# Patient Record
Sex: FEMALE | Race: WHITE | NOT HISPANIC OR LATINO
[De-identification: names, ages, dates, MRNs, and addresses within clinical notes are randomized per-mention and may not be internally consistent; named-entity substitution may affect disease eponyms.]

---

## 2018-10-02 ENCOUNTER — APPOINTMENT (OUTPATIENT)
Dept: ENDOVASCULAR SURGERY | Facility: CLINIC | Age: 58
End: 2018-10-02

## 2019-11-23 ENCOUNTER — INPATIENT (INPATIENT)
Facility: HOSPITAL | Age: 59
LOS: 3 days | Discharge: NURSING, REHAB OR LONG-TERM CARE | DRG: 871 | End: 2019-11-27
Attending: HOSPITALIST | Admitting: HOSPITALIST
Payer: MEDICARE

## 2019-11-23 VITALS
DIASTOLIC BLOOD PRESSURE: 67 MMHG | SYSTOLIC BLOOD PRESSURE: 97 MMHG | HEART RATE: 104 BPM | RESPIRATION RATE: 24 BRPM | OXYGEN SATURATION: 92 %

## 2019-11-23 LAB
ALBUMIN SERPL ELPH-MCNC: 3.7 G/DL — SIGNIFICANT CHANGE UP (ref 3.3–5)
ALP SERPL-CCNC: 161 U/L — HIGH (ref 40–120)
ALT FLD-CCNC: 8 U/L — LOW (ref 10–45)
AMMONIA BLD-MCNC: 39 UMOL/L — SIGNIFICANT CHANGE UP (ref 11–55)
ANION GAP SERPL CALC-SCNC: 15 MMOL/L — SIGNIFICANT CHANGE UP (ref 5–17)
ANISOCYTOSIS BLD QL: SIGNIFICANT CHANGE UP
APTT BLD: 44.7 SEC — HIGH (ref 27.5–36.3)
AST SERPL-CCNC: 27 U/L — SIGNIFICANT CHANGE UP (ref 10–40)
BASE EXCESS BLDV CALC-SCNC: 5.3 MMOL/L — HIGH (ref -2–2)
BASE EXCESS BLDV CALC-SCNC: 7.6 MMOL/L — HIGH (ref -2–2)
BASOPHILS # BLD AUTO: 0 K/UL — SIGNIFICANT CHANGE UP (ref 0–0.2)
BASOPHILS NFR BLD AUTO: 0 % — SIGNIFICANT CHANGE UP (ref 0–2)
BILIRUB SERPL-MCNC: 0.5 MG/DL — SIGNIFICANT CHANGE UP (ref 0.2–1.2)
BUN SERPL-MCNC: 13 MG/DL — SIGNIFICANT CHANGE UP (ref 7–23)
CA-I SERPL-SCNC: 1.1 MMOL/L — LOW (ref 1.12–1.3)
CA-I SERPL-SCNC: 1.11 MMOL/L — LOW (ref 1.12–1.3)
CALCIUM SERPL-MCNC: 9.4 MG/DL — SIGNIFICANT CHANGE UP (ref 8.4–10.5)
CHLORIDE BLDV-SCNC: 97 MMOL/L — SIGNIFICANT CHANGE UP (ref 96–108)
CHLORIDE BLDV-SCNC: 99 MMOL/L — SIGNIFICANT CHANGE UP (ref 96–108)
CHLORIDE SERPL-SCNC: 94 MMOL/L — LOW (ref 96–108)
CO2 BLDV-SCNC: 34 MMOL/L — HIGH (ref 22–30)
CO2 BLDV-SCNC: 35 MMOL/L — HIGH (ref 22–30)
CO2 SERPL-SCNC: 27 MMOL/L — SIGNIFICANT CHANGE UP (ref 22–31)
CREAT SERPL-MCNC: 2.39 MG/DL — HIGH (ref 0.5–1.3)
EOSINOPHIL # BLD AUTO: 0.22 K/UL — SIGNIFICANT CHANGE UP (ref 0–0.5)
EOSINOPHIL NFR BLD AUTO: 1.8 % — SIGNIFICANT CHANGE UP (ref 0–6)
GAS PNL BLDV: 137 MMOL/L — SIGNIFICANT CHANGE UP (ref 135–145)
GAS PNL BLDV: 138 MMOL/L — SIGNIFICANT CHANGE UP (ref 135–145)
GAS PNL BLDV: SIGNIFICANT CHANGE UP
GLUCOSE BLDV-MCNC: 111 MG/DL — HIGH (ref 70–99)
GLUCOSE BLDV-MCNC: 81 MG/DL — SIGNIFICANT CHANGE UP (ref 70–99)
GLUCOSE SERPL-MCNC: 88 MG/DL — SIGNIFICANT CHANGE UP (ref 70–99)
HCO3 BLDV-SCNC: 32 MMOL/L — HIGH (ref 21–29)
HCO3 BLDV-SCNC: 33 MMOL/L — HIGH (ref 21–29)
HCT VFR BLD CALC: 35.5 % — SIGNIFICANT CHANGE UP (ref 34.5–45)
HCT VFR BLDA CALC: 29 % — LOW (ref 39–50)
HCT VFR BLDA CALC: 33 % — LOW (ref 39–50)
HGB BLD CALC-MCNC: 10.6 G/DL — LOW (ref 11.5–15.5)
HGB BLD CALC-MCNC: 9.4 G/DL — LOW (ref 11.5–15.5)
HGB BLD-MCNC: 10.7 G/DL — LOW (ref 11.5–15.5)
INR BLD: 1.09 RATIO — SIGNIFICANT CHANGE UP (ref 0.88–1.16)
LACTATE BLDV-MCNC: 1.6 MMOL/L — SIGNIFICANT CHANGE UP (ref 0.7–2)
LACTATE BLDV-MCNC: 2.6 MMOL/L — HIGH (ref 0.7–2)
LYMPHOCYTES # BLD AUTO: 0.53 K/UL — LOW (ref 1–3.3)
LYMPHOCYTES # BLD AUTO: 4.4 % — LOW (ref 13–44)
MACROCYTES BLD QL: SIGNIFICANT CHANGE UP
MANUAL SMEAR VERIFICATION: SIGNIFICANT CHANGE UP
MCHC RBC-ENTMCNC: 30.1 GM/DL — LOW (ref 32–36)
MCHC RBC-ENTMCNC: 33 PG — SIGNIFICANT CHANGE UP (ref 27–34)
MCV RBC AUTO: 109.6 FL — HIGH (ref 80–100)
MONOCYTES # BLD AUTO: 0.95 K/UL — HIGH (ref 0–0.9)
MONOCYTES NFR BLD AUTO: 7.9 % — SIGNIFICANT CHANGE UP (ref 2–14)
NEUTROPHILS # BLD AUTO: 10.33 K/UL — HIGH (ref 1.8–7.4)
NEUTROPHILS NFR BLD AUTO: 83.3 % — HIGH (ref 43–77)
NEUTS BAND # BLD: 2.6 % — SIGNIFICANT CHANGE UP (ref 0–8)
PCO2 BLDV: 53 MMHG — HIGH (ref 35–50)
PCO2 BLDV: 63 MMHG — HIGH (ref 35–50)
PH BLDV: 7.32 — LOW (ref 7.35–7.45)
PH BLDV: 7.41 — SIGNIFICANT CHANGE UP (ref 7.35–7.45)
PLAT MORPH BLD: NORMAL — SIGNIFICANT CHANGE UP
PLATELET # BLD AUTO: 170 K/UL — SIGNIFICANT CHANGE UP (ref 150–400)
PO2 BLDV: 30 MMHG — SIGNIFICANT CHANGE UP (ref 25–45)
PO2 BLDV: 33 MMHG — SIGNIFICANT CHANGE UP (ref 25–45)
POLYCHROMASIA BLD QL SMEAR: SLIGHT — SIGNIFICANT CHANGE UP
POTASSIUM BLDV-SCNC: 4.1 MMOL/L — SIGNIFICANT CHANGE UP (ref 3.5–5.3)
POTASSIUM BLDV-SCNC: 4.3 MMOL/L — SIGNIFICANT CHANGE UP (ref 3.5–5.3)
POTASSIUM SERPL-MCNC: 3.8 MMOL/L — SIGNIFICANT CHANGE UP (ref 3.5–5.3)
POTASSIUM SERPL-SCNC: 3.8 MMOL/L — SIGNIFICANT CHANGE UP (ref 3.5–5.3)
PROT SERPL-MCNC: 8.6 G/DL — HIGH (ref 6–8.3)
PROTHROM AB SERPL-ACNC: 12.5 SEC — SIGNIFICANT CHANGE UP (ref 10–12.9)
RAPID RVP RESULT: SIGNIFICANT CHANGE UP
RBC # BLD: 3.24 M/UL — LOW (ref 3.8–5.2)
RBC # FLD: 15.9 % — HIGH (ref 10.3–14.5)
RBC BLD AUTO: NORMAL — SIGNIFICANT CHANGE UP
SAO2 % BLDV: 50 % — LOW (ref 67–88)
SAO2 % BLDV: 52 % — LOW (ref 67–88)
SODIUM SERPL-SCNC: 136 MMOL/L — SIGNIFICANT CHANGE UP (ref 135–145)
VALPROATE SERPL-MCNC: 29 UG/ML — LOW (ref 50–100)
WBC # BLD: 12.02 K/UL — HIGH (ref 3.8–10.5)
WBC # FLD AUTO: 12.02 K/UL — HIGH (ref 3.8–10.5)

## 2019-11-23 PROCEDURE — 93308 TTE F-UP OR LMTD: CPT | Mod: 26

## 2019-11-23 PROCEDURE — 70450 CT HEAD/BRAIN W/O DYE: CPT | Mod: 26

## 2019-11-23 PROCEDURE — 71045 X-RAY EXAM CHEST 1 VIEW: CPT | Mod: 26

## 2019-11-23 PROCEDURE — 71250 CT THORAX DX C-: CPT | Mod: 26

## 2019-11-23 PROCEDURE — 99291 CRITICAL CARE FIRST HOUR: CPT | Mod: GC

## 2019-11-23 RX ORDER — SODIUM CHLORIDE 9 MG/ML
250 INJECTION, SOLUTION INTRAVENOUS ONCE
Refills: 0 | Status: COMPLETED | OUTPATIENT
Start: 2019-11-23 | End: 2019-11-23

## 2019-11-23 RX ORDER — PIPERACILLIN AND TAZOBACTAM 4; .5 G/20ML; G/20ML
3.38 INJECTION, POWDER, LYOPHILIZED, FOR SOLUTION INTRAVENOUS ONCE
Refills: 0 | Status: COMPLETED | OUTPATIENT
Start: 2019-11-23 | End: 2019-11-23

## 2019-11-23 RX ORDER — ACETAMINOPHEN 500 MG
650 TABLET ORAL ONCE
Refills: 0 | Status: COMPLETED | OUTPATIENT
Start: 2019-11-23 | End: 2019-11-23

## 2019-11-23 RX ORDER — SODIUM CHLORIDE 9 MG/ML
500 INJECTION, SOLUTION INTRAVENOUS ONCE
Refills: 0 | Status: COMPLETED | OUTPATIENT
Start: 2019-11-23 | End: 2019-11-23

## 2019-11-23 RX ORDER — VANCOMYCIN HCL 1 G
1000 VIAL (EA) INTRAVENOUS ONCE
Refills: 0 | Status: COMPLETED | OUTPATIENT
Start: 2019-11-23 | End: 2019-11-23

## 2019-11-23 RX ADMIN — PIPERACILLIN AND TAZOBACTAM 200 GRAM(S): 4; .5 INJECTION, POWDER, LYOPHILIZED, FOR SOLUTION INTRAVENOUS at 16:17

## 2019-11-23 RX ADMIN — Medication 650 MILLIGRAM(S): at 16:17

## 2019-11-23 RX ADMIN — SODIUM CHLORIDE 250 MILLILITER(S): 9 INJECTION, SOLUTION INTRAVENOUS at 19:18

## 2019-11-23 RX ADMIN — SODIUM CHLORIDE 1000 MILLILITER(S): 9 INJECTION, SOLUTION INTRAVENOUS at 19:19

## 2019-11-23 RX ADMIN — Medication 250 MILLIGRAM(S): at 16:55

## 2019-11-23 RX ADMIN — SODIUM CHLORIDE 500 MILLILITER(S): 9 INJECTION, SOLUTION INTRAVENOUS at 19:18

## 2019-11-23 NOTE — CONSULT NOTE ADULT - SUBJECTIVE AND OBJECTIVE BOX
HPI:  Ms. Day is a 59 year-old woman with history of multiple medical issues including bipolar disease, lithium-induced nephrogenic diabetes insipidus, and end stage renal disease. She undergoes hemodialysis at [facility], Tuesdays and Saturdays, under the care of my partner Dr. Edgar Hilton. She was last dialyzed this morning. After dialysis, she was noted to be confused and was complaining of cough/generalized weakness/malaise. She was noted to have a systolic BP in the 80s in the ER. She has received 1.25L of LR since presentation.    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 No nausea, vomiting, or hematemesis; No diarrhea or constipation. No melena or hematochezia.  GENITOURINARY: No dysuria, frequency or hematuria  NEUROLOGICAL: No numbness or weakness  SKIN: No itching, burning, rashes, or lesions   All other review of systems is negative unless indicated above.    VITAL:  T(C): , Max: 39 (11-23-19 @ 16:06)  T(F): , Max: 102.2 (11-23-19 @ 16:06)  HR: 99 (11-23-19 @ 16:06)  BP: 97/79 (11-23-19 @ 16:06)  RR: 22 (11-23-19 @ 16:06)  SpO2: 100% (11-23-19 @ 16:06    PHYSICAL EXAM:  Constitutional: NAD, Alert  HEENT: NCAT, MMM  Neck: Supple, No JVD  Respiratory: CTA-b/l  Cardiovascular: RRR s1s2, no m/r/g  Gastrointestinal: BS+, soft, NT/ND  Extremities: No peripheral edema b/l  Neurological: no focal deficits; strength grossly intact  Back: no CVAT b/l  Skin: No rashes, no nevi  Access: R femoral AVF (+)thrill    LABS:                        10.7   12.02 )-----------( 170      ( 23 Nov 2019 16:14 )             35.5     Na(136)/K(3.8)/Cl(94)/HCO3(27)/BUN(13)/Cr(2.39)Glu(88)/Ca(9.4)/Mg(--)/PO4(--)    11-23 @ 16:14    IMAGING:  < from: Xray Chest 1 View AP/PA (11.23.19 @ 16:56) >  linear basilar atelectasis; no focal consolidation      ASSESSMENT:  (1)Renal - ESRD - HD [facility] on Tuesdays and Saturdays; s/p HD today; likely will not require further dialysis until Tuesday 11/26  (2)CV - hypotensive - septic shock    RECOMMEND:  (1)No further HD today  (2)Dose new meds for GFR<10/HD  (3)    Thank you for involving [facility] in this patient's care.    With warm regards,    Landon King MD   [facility] [employer]  [phone] HPI:  Ms. Day is a 59 year-old woman with history of multiple medical issues including bipolar disease, lithium-induced nephrogenic diabetes insipidus, and end stage renal disease. She undergoes hemodialysis at [facility], Tuesdays and Saturdays, under the care of my partner Dr. Edgar Hilton. She was last dialyzed this morning. After dialysis, she was noted to be confused and was complaining of cough/generalized weakness/malaise. She was noted to have a systolic BP in the 80s in the ER. She has received 1.25L of LR since presentation.    Ms. Day is accompanied in the ER by her brother Andrew. They tell me that she has been on HD for approximately 10 years. She has had several different HD access (primarily catheters) over the past few years. On multiple occasions she has been admitted with febrile illnesses and has been found to have infected tunneled catheters. Her vascular surgeon is Dr. Dhaval Romero at [facility].    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 No nausea, vomiting, or hematemesis; No diarrhea or constipation. No melena or hematochezia.  GENITOURINARY: No dysuria, frequency or hematuria  NEUROLOGICAL: No numbness or weakness  SKIN: No itching, burning, rashes, or lesions   All other review of systems is negative unless indicated above.    VITAL:  T(C): , Max: 39 (11-23-19 @ 16:06)  T(F): , Max: 102.2 (11-23-19 @ 16:06)  HR: 99 (11-23-19 @ 16:06)  BP: 97/79 (11-23-19 @ 16:06)  RR: 22 (11-23-19 @ 16:06)  SpO2: 100% (11-23-19 @ 16:06    PHYSICAL EXAM:  Constitutional: NAD, Alert  HEENT: NCAT, MMM  Neck: Supple, No JVD  Respiratory: CTA-b/l  Cardiovascular: RRR s1s2, no m/r/g  Gastrointestinal: BS+, soft, NT/ND  Extremities: No peripheral edema b/l  Neurological: no focal deficits; strength grossly intact  Back: no CVAT b/l  Skin: No rashes, no nevi  Access: R femoral tunneled catheter    LABS:                        10.7   12.02 )-----------( 170      ( 23 Nov 2019 16:14 )             35.5     Na(136)/K(3.8)/Cl(94)/HCO3(27)/BUN(13)/Cr(2.39)Glu(88)/Ca(9.4)/Mg(--)/PO4(--)    11-23 @ 16:14    IMAGING:  < from: Xray Chest 1 View AP/PA (11.23.19 @ 16:56) >  linear basilar atelectasis; no focal consolidation      ASSESSMENT:  (1)Renal - ESRD - HD BIW on Tuesdays and Saturdays; s/p HD today; likely will not require further dialysis until Tuesday 11/26  (2)CV - hypotension - septic? She is non-toxic-appearing at present. That being said, she has had fever today, and has a leukocytosis - there is certainly reason for concern that her catheter might be infected. Of note, Dialysis will not send off blood cultures directly from the catheter, per hospital policy.     RECOMMEND:  (1)No further HD today  (2)Dose new meds for GFR<10/HD  (3)Peripheral blood cultures    Thank you for involving [facility] in this patient's care.    With warm regards,    Landon King MD   [facility]  [phone]

## 2019-11-23 NOTE — ED PROVIDER NOTE - CLINICAL SUMMARY MEDICAL DECISION MAKING FREE TEXT BOX
Attending María Elena King: 58 y/o female with multiple medical issues including ESRD on HD, psychiatric disease, diabetes insipidus presenting with fever. upon arrival pt found to be febrile and transiently altered. no falls or trauma. pt did complete her dialysis. per family member pt with h/o fevers in the past, seen ID at [facility]. no rash on exam. no neck pain or rigidity to sugest meningitis. pt pan cultured, started on broad specruma abx. high risk for aspiration pna. pt hypotensive in the ed. given IVF slowly. will need admission, posisble ICU

## 2019-11-23 NOTE — ED ADULT NURSE NOTE - NSIMPLEMENTINTERV_GEN_ALL_ED
Implemented All Fall with Harm Risk Interventions:  [place] to call system. Call bell, personal items and telephone within reach. Instruct patient to call for assistance. Room bathroom lighting operational. Non-slip footwear when patient is off stretcher. Physically safe environment: no spills, clutter or unnecessary equipment. Stretcher in lowest position, wheels locked, appropriate side rails in place. Provide visual cue, wrist band, yellow gown, etc. Monitor gait and stability. Monitor for mental status changes and reorient to person, place, and time. Review medications for side effects contributing to fall risk. Reinforce activity limits and safety measures with patient and family. Provide visual clues: red socks.

## 2019-11-23 NOTE — ED ADULT NURSE NOTE - OBJECTIVE STATEMENT
59 y.o female pmh Diabetes insipidus- on HD 2 days a week and psychiatric disease presenting ti ED from dialysis for AMS post completion of dialysis. as per family at the bedside pt became confused after dialysis was completed and ambulance was called to bring pt to ED. sister at the bedside whom is a doctor states that pt is not confused at the time of ED arrival and assessment and is currently at her baseline mental status although pt has been c/o not feeling well x the passed few days. pt denies any cp, sob, numbness, tingling, abdominal pain or known sick contacts/ travel. old fistula to the left arm that is no longer used. labs and line obtained. febrile orally with temp of 102F, Tylenol PO administered. sister remains at the bedside. labs results and RVP pending. safety and fall precautions maintained. call bell at the bedside and within reach.

## 2019-11-23 NOTE — ED PROVIDER NOTE - PHYSICAL EXAMINATION
Attending María Elena King: Gen: NAD, heent: atrauamtic, eomi, perrla, mmm, op pink, uvula midline, neck; nttp, no nuchal rigidity, chest: nttp, no crepitus, cv: tachycardic, +murmur, lungs: rhonchi present, abd: soft, nontender, nondistended, no peritoneal signs, +BS, no guarding, ext: wwp, neg homans, skin: no rash, neuro: awake and alert, following commands, speech clear, sensation and strength intact, no focal deficits

## 2019-11-23 NOTE — ED PROVIDER NOTE - PROGRESS NOTE DETAILS
Attending María Elena King: BP 85/55 per family baseline is SBP of 90. pt febrile. ESRD on HD. extremities wwp. pt given broad spectrum abx. initial lactated elevated will continue slow hydration and abx. Attending María Elena King: on repeat exam pt persistently hypotensive. no B lines on pocus. will continue with IVF, may require pressors Spoke with patient sister Renata (health care proxy) [phone]. Sister is unable to arrange ER to ER transfer at this time, and [facility] ICU do0es not currently have bed open. Sister in agreement w plan for ICU/floor admit at [facility], and then possible in-pt transfer later in time. Will consult ICU at this time for assessment/eval.   Brandon Bo MD, PGY3 Emergency Medicine Attending María Elena King: d/w micu, pt stable for the floor, will admit to hospitalist

## 2019-11-23 NOTE — CONSULT NOTE ADULT - SUBJECTIVE AND OBJECTIVE BOX
CHIEF COMPLAINT: ams    HPI:    59F with PMhx of bipolar disease, lithium-induced nephrogenic diabetes insipidus, and end stage renal disease on HD, sent in to the ED for AMS and hypotension during dialysis. Patient upon examination unable to provide any history as to why she is here, but she states she is feeling well. She states she has been having a cough for the past few days, otherwise denies CP, SOB, abd pain, diarrhea, or lower extremity edema. She states she lives at assisted living.     In the ED, patient found to be febrile top 102.2, HR 95, BP 95/65, O2 96% and RR 14. Patient with Bps as low as 83/58. Patient received 1.25 L LR, vanc and zosyn in the ED. MICU consulted for hypotension.    Upon MICU evaluation, patient with BP of 103/69, HR78, afebrile and saturating 95% on 3L NC.    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  Bipolar Disorder: s/p high doses lithium and ECT in the past      FAMILY HISTORY:      SOCIAL HISTORY:  Smoking: [ ] Never Smoked [X ] Former Smoker (__ packs x ___ years) [ ] Current Smoker  (__ packs x ___ years)  Substance Use: [ ] Never Used [ ] Used ____  EtOH Use:  Marital Status: [ ] Single [ ]  [ ]  [ ]     Allergies    No Known Allergies    Intolerances        HOME MEDICATIONS:    REVIEW OF SYSTEMS:  Constitutional: [ X] negative [ ] fevers [ ] chills [ ] weight loss [ ] weight gain  HEENT: [ ] negative [ ] dry eyes [ ] eye irritation [ ] postnasal drip [X ] nasal congestion  CV: [ X] negative  [ ] chest pain [ ] orthopnea [ ] palpitations [ ] murmur  Resp: [X ] negative [ ] cough [ ] shortness of breath [ ] dyspnea [ ] wheezing [ ] sputum [ ] hemoptysis  GI: [ X] negative [ ] nausea [ ] vomiting [ ] diarrhea [ ] constipation [ ] abd pain [ ] dysphagia   : [X ] negative [ ] dysuria [ ] nocturia [ ] hematuria [ ] increased urinary frequency  Musculoskeletal: [ ] negative [ ] back pain [ ] myalgias [ ] arthralgias [ ] fracture  Skin: [ X] negative [ ] rash [ ] itch  Neurological: [X ] negative [ ] headache [ ] dizziness [ ] syncope [ ] weakness [ ] numbness  Psychiatric: [X ] negative [ ] anxiety [ ] depression  Endocrine: [ X] negative [ ] diabetes [ ] thyroid problem  Hematologic/Lymphatic: [ ] negative [ ] anemia [ ] bleeding problem  Allergic/Immunologic: [ ] negative [ ] itchy eyes [ ] nasal discharge [ ] hives [ ] angioedema  [ ] All other systems negative  [ ] Unable to assess ROS because ________    OBJECTIVE:  ICU Vital Signs Last 24 Hrs  T(C): 37.2 (23 Nov 2019 22:25), Max: 39 (23 Nov 2019 16:06)  T(F): 98.9 (23 Nov 2019 22:25), Max: 102.2 (23 Nov 2019 16:06)  HR: 78 (23 Nov 2019 22:25) (78 - 104)  BP: 103/69 (23 Nov 2019 22:25) (70/52 - 103/69)  BP(mean): --  ABP: --  ABP(mean): --  RR: 19 (23 Nov 2019 22:25) (18 - 24)  SpO2: 95% (23 Nov 2019 22:25) (92% - 100%)        CAPILLARY BLOOD GLUCOSE      POCT Blood Glucose.: 103 mg/dL (23 Nov 2019 15:02)      PHYSICAL EXAM:  General: well appearing, slightly disheveled NAD, speaking in full sentences   HEENT: EOMI, PERRLA  Lymph Nodes: no palpable lymphadneopathy  Neck: supple  Respiratory: clear to auscultation anteriorly   Cardiovascular: S1/S2, RRR, no murmurs  Abdomen: soft, nontender, nondistended, +BS  Extremities: right femoral shiley without surrounding erythema or flucutance, no TTP  Skin: no rashes noted  Neurological: AxOx2  Psychiatry: normal mood and affect        LABS:                        10.7   12.02 )-----------( 170      ( 23 Nov 2019 16:14 )             35.5     Hgb Trend: 10.7<--  11-23    136  |  94<L>  |  13  ----------------------------<  88  3.8   |  27  |  2.39<H>    Ca    9.4      23 Nov 2019 16:14    TPro  8.6<H>  /  Alb  3.7  /  TBili  0.5  /  DBili  x   /  AST  27  /  ALT  8<L>  /  AlkPhos  161<H>  11-23    Creatinine Trend: 2.39<--  PT/INR - ( 23 Nov 2019 16:14 )   PT: 12.5 sec;   INR: 1.09 ratio         PTT - ( 23 Nov 2019 16:14 )  PTT:44.7 sec      Venous Blood Gas:  11-23 @ 18:47  7.32/63/33/32/52  VBG Lactate: 1.6  Venous Blood Gas:  11-23 @ 16:14  7.41/53/30/33/50  VBG Lactate: 2.6    CXR: no focal findings  MICROBIOLOGY: CHIEF COMPLAINT: ams    HPI:    59F with PMhx of bipolar disease, lithium-induced nephrogenic diabetes insipidus, and end stage renal disease on HD, sent in to the ED for AMS and hypotension during dialysis. Patient upon examination unable to provide any history as to why she is here, but she states she is feeling well. She states she has been having a cough for the past few days, otherwise denies CP, SOB, abd pain, diarrhea, or lower extremity edema. She states she lives at assisted living.     In the ED, patient found to be febrile top 102.2, HR 95, BP 95/65, O2 96% and RR 14. Patient with Bps as low as 83/58. Patient received 1.25 L LR, vanc and zosyn in the ED. MICU consulted for hypotension.    Upon MICU evaluation, patient with BP of 103/69, HR78, afebrile and saturating 95% on 3L NC.    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  Bipolar Disorder: s/p high doses lithium and ECT in the past      FAMILY HISTORY: no signficant hx      SOCIAL HISTORY:  Smoking: [ ] Never Smoked [X ] Former Smoker (__ packs x ___ years) [ ] Current Smoker  (__ packs x ___ years)  Substance Use: [ ] Never Used [ ] Used ____  EtOH Use:  Marital Status: [ ] Single [ ]  [ ]  [ ]     Allergies  No Known Allergies    REVIEW OF SYSTEMS:  Constitutional: [ X] negative [ ] fevers [ ] chills [ ] weight loss [ ] weight gain  HEENT: [ ] negative [ ] dry eyes [ ] eye irritation [ ] postnasal drip [X ] nasal congestion  CV: [ X] negative  [ ] chest pain [ ] orthopnea [ ] palpitations [ ] murmur  Resp: [ ] negative [ ] cough [ ] shortness of breath [ ] dyspnea [ ] wheezing [ x] sputum [ ] hemoptysis  GI: [ X] negative [ ] nausea [ ] vomiting [ ] diarrhea [ ] constipation [ ] abd pain [ ] dysphagia   : [X ] negative [ ] dysuria [ ] nocturia [ ] hematuria [ ] increased urinary frequency  Musculoskeletal: [ ] negative [ ] back pain [ ] myalgias [ ] arthralgias [ ] fracture  Skin: [ X] negative [ ] rash [ ] itch  Neurological: [X ] negative [ ] headache [ ] dizziness [ ] syncope [ ] weakness [ ] numbness  Psychiatric: [X ] negative [ ] anxiety [ ] depression  Endocrine: [ X] negative [ ] diabetes [ ] thyroid problem  Hematologic/Lymphatic: [ ] negative [ ] anemia [ ] bleeding problem  Allergic/Immunologic: [ ] negative [ ] itchy eyes [ ] nasal discharge [ ] hives [ ] angioedema  [x ] All other systems negative  [ ] Unable to assess ROS because ________    OBJECTIVE:  ICU Vital Signs Last 24 Hrs  T(C): 37.2 (23 Nov 2019 22:25), Max: 39 (23 Nov 2019 16:06)  T(F): 98.9 (23 Nov 2019 22:25), Max: 102.2 (23 Nov 2019 16:06)  HR: 78 (23 Nov 2019 22:25) (78 - 104)  BP: 103/69 (23 Nov 2019 22:25) (70/52 - 103/69)  RR: 19 (23 Nov 2019 22:25) (18 - 24)  SpO2: 95% (23 Nov 2019 22:25) (92% - 100%)    CAPILLARY BLOOD GLUCOSE  POCT Blood lucose.: 103 mg/dL (23 Nov 2019 15:02)    PHYSICAL EXAM:  General: well appearing, slightly disheveled NAD, speaking in full sentences   HEENT: EOMI, PERRLA  Lymph Nodes: no palpable lymphadneopathy  Neck: supple  Respiratory: clear to auscultation anteriorly   Cardiovascular: S1/S2, RRR, no murmurs  Abdomen: soft, nontender, nondistended, +BS  Extremities: right femoral shiley without surrounding erythema or flucutance, no TTP  Skin: no rashes noted  Neurological: AxOx2  Psychiatry: normal mood and affect        LABS:                        10.7   12.02 )-----------( 170      ( 23 Nov 2019 16:14 )             35.5     Hgb Trend: 10.7<--  11-23    136  |  94<L>  |  13  ----------------------------<  88  3.8   |  27  |  2.39<H>    Ca    9.4      23 Nov 2019 16:14    TPro  8.6<H>  /  Alb  3.7  /  TBili  0.5  /  DBili  x   /  AST  27  /  ALT  8<L>  /  AlkPhos  161<H>  11-23    Creatinine Trend: 2.39<--  PT/INR - ( 23 Nov 2019 16:14 )   PT: 12.5 sec;   INR: 1.09 ratio         PTT - ( 23 Nov 2019 16:14 )  PTT:44.7 sec      Venous Blood Gas:  11-23 @ 18:47  7.32/63/33/32/52  VBG Lactate: 1.6  Venous Blood Gas:  11-23 @ 16:14  7.41/53/30/33/50  VBG Lactate: 2.6    CXR: no focal findings  MICROBIOLOGY: cultures pending    Radiology:  Reviewed and interpreted by me.  CT Chest: bilateral nodules, R pleural thickening and small pleural effusion, bibasilar infiltrates

## 2019-11-23 NOTE — CONSULT NOTE ADULT - ASSESSMENT
59F with PMhx of bipolar disease, lithium-induced nephrogenic diabetes insipidus, and end stage renal disease on HD, sent in to the ED for AMS and hypotension, with likely sepsis of unknown etiology.    1. Hypotension  - secondary to septic shock--> now resolved after fluid resuscitation  - c/w broad spectrum antibiotics--> if no source elucidated, may need to remove shiley catheter  - resume home dose of midodrine  - follow up blood cultures; patient reports she makes urine--> would bladder scan and obtain UA if possible.    *To be discussed with attending

## 2019-11-23 NOTE — ED PROVIDER NOTE - ATTENDING CONTRIBUTION TO CARE
Attending MD María Elena King:  I personally have seen and examined this patient.  Resident note reviewed and agree on plan of care and except where noted.  See HPI, PE, and MDM for details.

## 2019-11-23 NOTE — CONSULT NOTE ADULT - ATTENDING COMMENTS
Patient seen and examined.  Agree with resident note as above.  Patient with hx as noted including ESRD who presents with hypoTN and lethargy at HD< found to have fever, leukocytosis, CT chest with small B/L consolidations.  Complains of chest congestion.  Most consistent with severe sepsis with hypoTN due to community acquired PNA.  Other ddx for sepsis includes groin line.  Pt was gently resuscitated and BP now steadily ~90.  Recommend follow cx, treat as you are doing for CAP, gentle fluid boluses prn for recurrent hypoTN, midodrine 10q8.  Full recs as above and I have edited as appropriate.

## 2019-11-23 NOTE — ED PROVIDER NOTE - OBJECTIVE STATEMENT
Attending María Elena King: 58 y/o female with multiple medical issues including psychiatric disease, diabetes insipidus on HD two days a week. presenting with fevers and AMS. pt completed dialysis and then reportedly became confused and sent to the ed. no reports of falls or trauma. family member who is a physician states has a h/o fevers of unclear etiology. normally receives full care at [facility]. has been seen by ID in the past. pt now not confused but complaining of not feeling well. reports a mild cough. no n/v. per family member does get aspirtion pna. has a fistula in right groin. does make urine

## 2019-11-24 DIAGNOSIS — Z79.899 OTHER LONG TERM (CURRENT) DRUG THERAPY: ICD-10-CM

## 2019-11-24 DIAGNOSIS — F31.9 BIPOLAR DISORDER, UNSPECIFIED: ICD-10-CM

## 2019-11-24 DIAGNOSIS — D53.9 NUTRITIONAL ANEMIA, UNSPECIFIED: ICD-10-CM

## 2019-11-24 DIAGNOSIS — Z29.9 ENCOUNTER FOR PROPHYLACTIC MEASURES, UNSPECIFIED: ICD-10-CM

## 2019-11-24 DIAGNOSIS — R91.1 SOLITARY PULMONARY NODULE: ICD-10-CM

## 2019-11-24 DIAGNOSIS — C20 MALIGNANT NEOPLASM OF RECTUM: ICD-10-CM

## 2019-11-24 DIAGNOSIS — A41.9 SEPSIS, UNSPECIFIED ORGANISM: ICD-10-CM

## 2019-11-24 DIAGNOSIS — J18.9 PNEUMONIA, UNSPECIFIED ORGANISM: ICD-10-CM

## 2019-11-24 DIAGNOSIS — N25.1 NEPHROGENIC DIABETES INSIPIDUS: ICD-10-CM

## 2019-11-24 DIAGNOSIS — Z02.9 ENCOUNTER FOR ADMINISTRATIVE EXAMINATIONS, UNSPECIFIED: ICD-10-CM

## 2019-11-24 DIAGNOSIS — N18.6 END STAGE RENAL DISEASE: ICD-10-CM

## 2019-11-24 DIAGNOSIS — K76.9 LIVER DISEASE, UNSPECIFIED: ICD-10-CM

## 2019-11-24 DIAGNOSIS — I77.0 ARTERIOVENOUS FISTULA, ACQUIRED: Chronic | ICD-10-CM

## 2019-11-24 LAB
ALBUMIN SERPL ELPH-MCNC: 2.9 G/DL — LOW (ref 3.3–5)
ALP SERPL-CCNC: 123 U/L — HIGH (ref 40–120)
ALT FLD-CCNC: 5 U/L — LOW (ref 10–45)
ANION GAP SERPL CALC-SCNC: 13 MMOL/L — SIGNIFICANT CHANGE UP (ref 5–17)
APPEARANCE UR: CLEAR — SIGNIFICANT CHANGE UP
AST SERPL-CCNC: 16 U/L — SIGNIFICANT CHANGE UP (ref 10–40)
BACTERIA # UR AUTO: NEGATIVE — SIGNIFICANT CHANGE UP
BILIRUB SERPL-MCNC: 0.3 MG/DL — SIGNIFICANT CHANGE UP (ref 0.2–1.2)
BILIRUB UR-MCNC: NEGATIVE — SIGNIFICANT CHANGE UP
BUN SERPL-MCNC: 21 MG/DL — SIGNIFICANT CHANGE UP (ref 7–23)
CALCIUM SERPL-MCNC: 8.8 MG/DL — SIGNIFICANT CHANGE UP (ref 8.4–10.5)
CHLORIDE SERPL-SCNC: 102 MMOL/L — SIGNIFICANT CHANGE UP (ref 96–108)
CO2 SERPL-SCNC: 26 MMOL/L — SIGNIFICANT CHANGE UP (ref 22–31)
COLOR SPEC: SIGNIFICANT CHANGE UP
CREAT SERPL-MCNC: 3.45 MG/DL — HIGH (ref 0.5–1.3)
DIFF PNL FLD: ABNORMAL
EPI CELLS # UR: 0 /HPF — SIGNIFICANT CHANGE UP
FOLATE SERPL-MCNC: 14.4 NG/ML — SIGNIFICANT CHANGE UP
GLUCOSE SERPL-MCNC: 88 MG/DL — SIGNIFICANT CHANGE UP (ref 70–99)
GLUCOSE UR QL: NEGATIVE — SIGNIFICANT CHANGE UP
HBV SURFACE AB SER-ACNC: REACTIVE
HBV SURFACE AG SER-ACNC: SIGNIFICANT CHANGE UP
HCT VFR BLD CALC: 31.3 % — LOW (ref 34.5–45)
HGB BLD-MCNC: 9.3 G/DL — LOW (ref 11.5–15.5)
HYALINE CASTS # UR AUTO: 2 /LPF — SIGNIFICANT CHANGE UP (ref 0–2)
KETONES UR-MCNC: NEGATIVE — SIGNIFICANT CHANGE UP
LEGIONELLA AG UR QL: NEGATIVE — SIGNIFICANT CHANGE UP
LEUKOCYTE ESTERASE UR-ACNC: ABNORMAL
MAGNESIUM SERPL-MCNC: 2.5 MG/DL — SIGNIFICANT CHANGE UP (ref 1.6–2.6)
MCHC RBC-ENTMCNC: 29.7 GM/DL — LOW (ref 32–36)
MCHC RBC-ENTMCNC: 33.8 PG — SIGNIFICANT CHANGE UP (ref 27–34)
MCV RBC AUTO: 113.8 FL — HIGH (ref 80–100)
NITRITE UR-MCNC: NEGATIVE — SIGNIFICANT CHANGE UP
PH UR: 8.5 — HIGH (ref 5–8)
PHOSPHATE SERPL-MCNC: 3.2 MG/DL — SIGNIFICANT CHANGE UP (ref 2.5–4.5)
PLATELET # BLD AUTO: 164 K/UL — SIGNIFICANT CHANGE UP (ref 150–400)
POTASSIUM SERPL-MCNC: 4 MMOL/L — SIGNIFICANT CHANGE UP (ref 3.5–5.3)
POTASSIUM SERPL-SCNC: 4 MMOL/L — SIGNIFICANT CHANGE UP (ref 3.5–5.3)
PROT SERPL-MCNC: 6.8 G/DL — SIGNIFICANT CHANGE UP (ref 6–8.3)
PROT UR-MCNC: ABNORMAL
RBC # BLD: 2.75 M/UL — LOW (ref 3.8–5.2)
RBC # FLD: 16 % — HIGH (ref 10.3–14.5)
RBC CASTS # UR COMP ASSIST: 5 /HPF — HIGH (ref 0–4)
SODIUM SERPL-SCNC: 141 MMOL/L — SIGNIFICANT CHANGE UP (ref 135–145)
SP GR SPEC: 1.01 — LOW (ref 1.01–1.02)
UROBILINOGEN FLD QL: NEGATIVE — SIGNIFICANT CHANGE UP
VANCOMYCIN TROUGH SERPL-MCNC: 15.5 UG/ML — SIGNIFICANT CHANGE UP (ref 10–20)
VIT B12 SERPL-MCNC: 759 PG/ML — SIGNIFICANT CHANGE UP (ref 232–1245)
WBC # BLD: 11.62 K/UL — HIGH (ref 3.8–10.5)
WBC # FLD AUTO: 11.62 K/UL — HIGH (ref 3.8–10.5)
WBC UR QL: 91 /HPF — HIGH (ref 0–5)

## 2019-11-24 PROCEDURE — 99285 EMERGENCY DEPT VISIT HI MDM: CPT | Mod: GC

## 2019-11-24 PROCEDURE — 12345: CPT | Mod: NC,GC

## 2019-11-24 PROCEDURE — 99291 CRITICAL CARE FIRST HOUR: CPT

## 2019-11-24 RX ORDER — OLANZAPINE 15 MG/1
10 TABLET, FILM COATED ORAL DAILY
Refills: 0 | Status: DISCONTINUED | OUTPATIENT
Start: 2019-11-25 | End: 2019-11-27

## 2019-11-24 RX ORDER — PIPERACILLIN AND TAZOBACTAM 4; .5 G/20ML; G/20ML
3.38 INJECTION, POWDER, LYOPHILIZED, FOR SOLUTION INTRAVENOUS EVERY 12 HOURS
Refills: 0 | Status: DISCONTINUED | OUTPATIENT
Start: 2019-11-24 | End: 2019-11-24

## 2019-11-24 RX ORDER — CEFTRIAXONE 500 MG/1
1000 INJECTION, POWDER, FOR SOLUTION INTRAMUSCULAR; INTRAVENOUS EVERY 24 HOURS
Refills: 0 | Status: DISCONTINUED | OUTPATIENT
Start: 2019-11-24 | End: 2019-11-24

## 2019-11-24 RX ORDER — AZITHROMYCIN 500 MG/1
500 TABLET, FILM COATED ORAL EVERY 24 HOURS
Refills: 0 | Status: DISCONTINUED | OUTPATIENT
Start: 2019-11-24 | End: 2019-11-24

## 2019-11-24 RX ORDER — PIPERACILLIN AND TAZOBACTAM 4; .5 G/20ML; G/20ML
3.38 INJECTION, POWDER, LYOPHILIZED, FOR SOLUTION INTRAVENOUS EVERY 12 HOURS
Refills: 0 | Status: DISCONTINUED | OUTPATIENT
Start: 2019-11-24 | End: 2019-11-27

## 2019-11-24 RX ORDER — HEPARIN SODIUM 5000 [USP'U]/ML
5000 INJECTION INTRAVENOUS; SUBCUTANEOUS EVERY 8 HOURS
Refills: 0 | Status: DISCONTINUED | OUTPATIENT
Start: 2019-11-24 | End: 2019-11-27

## 2019-11-24 RX ORDER — MIDODRINE HYDROCHLORIDE 2.5 MG/1
10 TABLET ORAL EVERY 8 HOURS
Refills: 0 | Status: DISCONTINUED | OUTPATIENT
Start: 2019-11-24 | End: 2019-11-27

## 2019-11-24 RX ORDER — DIVALPROEX SODIUM 500 MG/1
500 TABLET, DELAYED RELEASE ORAL
Refills: 0 | Status: DISCONTINUED | OUTPATIENT
Start: 2019-11-24 | End: 2019-11-27

## 2019-11-24 RX ORDER — OLANZAPINE 15 MG/1
5 TABLET, FILM COATED ORAL DAILY
Refills: 0 | Status: DISCONTINUED | OUTPATIENT
Start: 2019-11-24 | End: 2019-11-24

## 2019-11-24 RX ORDER — SODIUM CHLORIDE 9 MG/ML
250 INJECTION, SOLUTION INTRAVENOUS ONCE
Refills: 0 | Status: COMPLETED | OUTPATIENT
Start: 2019-11-24 | End: 2019-11-24

## 2019-11-24 RX ADMIN — AZITHROMYCIN 250 MILLIGRAM(S): 500 TABLET, FILM COATED ORAL at 13:10

## 2019-11-24 RX ADMIN — HEPARIN SODIUM 5000 UNIT(S): 5000 INJECTION INTRAVENOUS; SUBCUTANEOUS at 22:01

## 2019-11-24 RX ADMIN — MIDODRINE HYDROCHLORIDE 10 MILLIGRAM(S): 2.5 TABLET ORAL at 19:19

## 2019-11-24 RX ADMIN — DIVALPROEX SODIUM 500 MILLIGRAM(S): 500 TABLET, DELAYED RELEASE ORAL at 19:42

## 2019-11-24 RX ADMIN — HEPARIN SODIUM 5000 UNIT(S): 5000 INJECTION INTRAVENOUS; SUBCUTANEOUS at 06:52

## 2019-11-24 RX ADMIN — CEFTRIAXONE 100 MILLIGRAM(S): 500 INJECTION, POWDER, FOR SOLUTION INTRAMUSCULAR; INTRAVENOUS at 13:10

## 2019-11-24 RX ADMIN — PIPERACILLIN AND TAZOBACTAM 25 GRAM(S): 4; .5 INJECTION, POWDER, LYOPHILIZED, FOR SOLUTION INTRAVENOUS at 03:59

## 2019-11-24 RX ADMIN — SODIUM CHLORIDE 500 MILLILITER(S): 9 INJECTION, SOLUTION INTRAVENOUS at 00:49

## 2019-11-24 RX ADMIN — PIPERACILLIN AND TAZOBACTAM 25 GRAM(S): 4; .5 INJECTION, POWDER, LYOPHILIZED, FOR SOLUTION INTRAVENOUS at 19:15

## 2019-11-24 RX ADMIN — MIDODRINE HYDROCHLORIDE 10 MILLIGRAM(S): 2.5 TABLET ORAL at 10:45

## 2019-11-24 RX ADMIN — DIVALPROEX SODIUM 500 MILLIGRAM(S): 500 TABLET, DELAYED RELEASE ORAL at 06:53

## 2019-11-24 RX ADMIN — MIDODRINE HYDROCHLORIDE 10 MILLIGRAM(S): 2.5 TABLET ORAL at 02:11

## 2019-11-24 RX ADMIN — HEPARIN SODIUM 5000 UNIT(S): 5000 INJECTION INTRAVENOUS; SUBCUTANEOUS at 13:10

## 2019-11-24 RX ADMIN — OLANZAPINE 5 MILLIGRAM(S): 15 TABLET, FILM COATED ORAL at 11:24

## 2019-11-24 NOTE — H&P ADULT - ATTENDING COMMENTS
I personally provided 35 minutes of critical care for the patient. Patient suffering from severe sepsis (now improving) causing encephelopathy with evidence of acute hypoxic respiratory failure.    Patient's emergency contact is her sister Dr. Renata Day. I will attempt to personally contact her to discuss the plan.     Patient seen and examined at bedside with resident. Below are my findings and assessment:     59F with PMHx of bipolar disorder (previously on lithium), diabetes insipidus, and ESRD presents to [facility] after hemodialysis when it was noted that she was altered and hypotensive. Patient apparently with systolics in the 90s at baseline, but after hemodialysis today it was worse and was 70s here in our emergency room. Given her change in mentation patient was sent to the ED with stroke code called and then eventually cancelled. On presentation patient initially febrile, hypotensive, and with a documented saturation in the mid-90s on 4 L NC. Patient given 500 cc bolus at a time with slow improvement in BP. MICU consulted and patient is not a candidate at this time. Patient also given vancomycin, Tylenol, & Zosyn with blood and urine cultures drawn. Nephrology was consulted and their recommendations are appreciated. Of note patient has catheter in her groin which is used for dialysis. 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 I personally provided 35 minutes of critical care for the patient. Patient suffering from severe sepsis (now improving) causing encephelopathy with evidence of acute hypoxic respiratory failure.    Patient's emergency contact is her sister Dr. Renata Day. I will attempt to personally contact her to discuss the plan.     Patient seen and examined at bedside with resident. Below are my findings and assessment:     59F with PMHx of bipolar disorder (previously on lithium), diabetes insipidus, prior infected hemodialysis catheters, and ESRD presents to [facility] after hemodialysis when it was noted that she was altered and hypotensive. Patient apparently with systolics in the 90s at baseline, but after hemodialysis today it was worse and was 70s here in our emergency room. Given her change in mentation patient was sent to the ED with stroke code called and then eventually cancelled. On presentation patient initially febrile, hypotensive, and with a documented saturation in the mid-90s on 4 L NC. Patient given 500 cc bolus at a time with slow improvement in BP. MICU consulted and patient is not a candidate at this time. Patient also given vancomycin, Tylenol, & Zosyn with blood and urine cultures drawn. Nephrology was consulted and their recommendations are appreciated. Of note patient has femoral dialysis catheter with evidence of a failed fistula in her right arm.    When seen by me in the ED patient mentating well and was hungry. She started to look through her bag for a snack. Prior to this her O2 saturation on 4 L was in the high 80s, but with exertion dropped down to mid-70s and then self-corrected. The pulse ox is on her ear and appears ragged and the wave form was poor. She tells me she doesn't really know what happened, but that she must have been out of it since she hasn't eaten in a while.    Labs/Imaging:  WBC: 12, Hg: 10.7, MCV: 109  BUN/Cr: 13/2.39  pH: 7.32, Lactate: 2.6 -> 1.6  UA: 91 WBC, 5 RBC    EKG personally reviewed by me: prolonged QTc at 475  CT Head personally reviewed by me: no acute intracranial bleed  CT Chest personally reviewed by me: bilateral basal air space consolidation, bilateral pulmonary nodules, indeterminate liver lesions.    Assessment/Plan:  59F with PMHx of bipolar disorder (previously on lithium), diabetes insipidus, prior infected hemodialysis catheters, and ESRD presents to [facility] after hemodialysis when it was noted that she was altered and hypotensive. Patient improving after antibiotics and fluid resuscitation. Also noted by me to have acute hypoxic respiratory failure correcting with nasal cannula. Labs significant for a leukocytosis and macrocytic anemia. CT Chest showing bilateral air space opacities. Patient likely suffering from encephalopathy due to infection vs. hypoxia. Given total clinical picture will treat patient for healthcare associated pneumonia. Given her prior catheter infections will also provide MRSA/MSSA coverage.     #Encephelopathy - likely infectious possibly due to healthcare associated pneumonia meeting sepsis criteria  -Continue with Zosyn  -Dose vanc by level given ESRD  -Follow up blood cultures, if growing organism such as staph grows need to consider catheter infection, will consider TTE at that time  -Defer coverage for atypicals for now    #Acute Hypoxic Respiratory Failure  -Nasal cannula as needed to maintain saturation above 88%  -Titrate down when feasible    #Bipolar disorder - will continue patient's home medications    #ESRD - [facility], will dialyze patient at their discretion     #Prolonged QTc - avoid QT prolonging medications    #Macrocytic Anemia - send B12 level    #Bilateral Pulmonary Nodules - undetermined significance, will need to relay finding to family and patient for outpatient follow up    #Liver Lesions - again unknown significance, will relay to patient and family    Rest of plan as documented by resident. I personally provided 35 minutes of critical care for the patient. Patient suffering from severe sepsis (now improving) causing encephelopathy with evidence of acute hypoxic respiratory failure.    Patient's emergency contact is her sister Dr. Renata Day. I will attempt to personally contact her to discuss the plan.     Patient seen and examined at bedside with resident. Below are my findings and assessment:     59F with PMHx of bipolar disorder (previously on lithium), diabetes insipidus, prior infected hemodialysis catheters, and ESRD presents to [facility] after hemodialysis when it was noted that she was altered and hypotensive. Patient apparently with systolics in the 90s at baseline, but after hemodialysis today it was worse and was 70s here in our emergency room. Given her change in mentation patient was sent to the ED with stroke code called and then eventually cancelled. On presentation patient initially febrile, hypotensive, and with a documented saturation in the mid-90s on 4 L NC. Patient given 500 cc bolus at a time with slow improvement in BP. MICU consulted and patient is not a candidate at this time. Patient also given vancomycin, Tylenol, & Zosyn with blood and urine cultures drawn. Nephrology was consulted and their recommendations are appreciated. Of note patient has femoral dialysis catheter with evidence of a failed fistula in her right arm.    When seen by me in the ED patient mentating well and was hungry. She started to look through her bag for a snack. Prior to this her O2 saturation on 4 L was in the high 80s, but with exertion dropped down to mid-70s and then self-corrected. The pulse ox is on her ear and appears ragged and the wave form was poor. She tells me she doesn't really know what happened, but that she must have been out of it since she hasn't eaten in a while.    Labs/Imaging:  WBC: 12, Hg: 10.7, MCV: 109  BUN/Cr: 13/2.39  pH: 7.32, Lactate: 2.6 -> 1.6  UA: 91 WBC, 5 RBC    EKG personally reviewed by me: prolonged QTc at 475  CT Head personally reviewed by me: no acute intracranial bleed  CT Chest personally reviewed by me: bilateral basal air space consolidation, bilateral pulmonary nodules, indeterminate liver lesions.    Assessment/Plan:  59F with PMHx of bipolar disorder (previously on lithium), diabetes insipidus, prior infected hemodialysis catheters, and ESRD presents to [facility] after hemodialysis when it was noted that she was altered and hypotensive. Patient improving after antibiotics and fluid resuscitation. Also noted by me to have acute hypoxic respiratory failure correcting with nasal cannula. Labs significant for a leukocytosis and macrocytic anemia. CT Chest showing bilateral air space opacities. Patient likely suffering from encephalopathy due to infection vs. hypoxia. Given total clinical picture will treat patient for healthcare associated pneumonia. Given her prior catheter infections will also provide MRSA/MSSA coverage.     #Encephelopathy - likely infectious possibly due to healthcare associated pneumonia meeting sepsis criteria  -Continue with Zosyn  -Dose vanc by level given ESRD  -Follow up blood cultures, if growing organism such as staph grows need to consider catheter infection, will consider TTE at that time  -Defer coverage for atypicals for now    #Acute Hypoxic Respiratory Failure  -Nasal cannula as needed to maintain saturation above 88%  -Titrate down when feasible    #Hypotension - suspect component of vasculopathy  -Continue with home Midodrine    #Bipolar disorder - will continue patient's home medications    #ESRD - [facility], will dialyze patient at their discretion     #Prolonged QTc - avoid QT prolonging medications    #Macrocytic Anemia - send B12 level    #Bilateral Pulmonary Nodules - unknown significance, will need to relay finding to family and patient for outpatient follow up    #Liver Lesions - again unknown significance, will relay to patient and family for outpatient follow up    Rest of plan as documented by resident.

## 2019-11-24 NOTE — H&P ADULT - PROBLEM SELECTOR PLAN 7
- HSQ - Patient not sure of all her meds. Called sister to get her meds, however she said she will bring a copy of her med list in the morning. Please follow up and update medrec

## 2019-11-24 NOTE — H&P ADULT - HISTORY OF PRESENT ILLNESS
59F w/ bipolar disorder, acquired nephrogenic diabetes and ESRD on HD (Tue, Sat) secondary to lithium toxicity sent to the ED from dialysis center for hypotension and AMS during dialysis. Patient reports nonproductive cough for the past few days. She went for dialysis yesterday and could not provide information about what happened. After completing dialysis, she was confused and her BP was low. Per sister (Renata), patient normally has soft BP and has been on Midodrine. Denies chest pain, palpitations, dyspnea, nausea. vomiting     ED course:  - BP 95/65 -> 70/52 -> 102/72, T 102.2,  SpO2  96% on NC  - RVP negative   - CT chest: Bilateral lower lobe airspace consolidations  - Given 1.5L IVF, Vanc/Zosyn  - MICU consulted for hypotension. 59F w/ bipolar disorder, acquired nephrogenic diabetes and ESRD on HD (Tue, Sat) secondary to lithium toxicity sent to the ED from dialysis center for hypotension and AMS during dialysis. Patient reports nonproductive cough for the past few days. She went for dialysis yesterday and could not provide information about what happened. After completing dialysis, she was confused and her BP was low. Per sister (Renata), patient normally has soft BP and has been on Midodrine. Denies chest pain, palpitations, dyspnea, nausea. vomiting, dysuria     ED course:  - BP 95/65 -> 70/52 -> 102/72, T 102.2,  SpO2  96% on NC  - RVP negative   - CT chest: Bilateral lower lobe airspace consolidations  - Given 1.5L IVF, Vanc/Zosyn  - MICU consulted for hypotension.

## 2019-11-24 NOTE — H&P ADULT - NSHPREVIEWOFSYSTEMS_GEN_ALL_CORE
CONSTITUTIONAL: +Chills  EYES/ENT: No visual changes;  No  throat pain   NECK: No pain   RESPIRATORY: +cough. No wheezing, hemoptysis; No shortness of breath  CARDIOVASCULAR: No chest pain or palpitations  GASTROINTESTINAL: No abdominal or epigastric pain. No nausea, vomiting, or hematemesis; No diarrhea or constipation. No melena or hematochezia.  GENITOURINARY: No dysuria, frequency or hematuria  NEUROLOGICAL: No numbness or weakness  SKIN: No itching, rashes CONSTITUTIONAL: +Chills, no subjective fevers  EYES/ENT: No visual changes;  No  throat pain   RESPIRATORY: +cough. No wheezing, hemoptysis; No shortness of breath  CARDIOVASCULAR: No chest pain or palpitations  GASTROINTESTINAL: No abdominal or epigastric pain. No nausea, vomiting, or hematemesis; No diarrhea or constipation. No melena or hematochezia.  GENITOURINARY: No dysuria, frequency or hematuria  NEUROLOGICAL: No numbness or weakness  SKIN: No itching, rashes  Psych: no SI or HI  Heme/Onc: no purpura, petechiae or night sweats  MSK: no arthralgias or joint swelling

## 2019-11-24 NOTE — PROGRESS NOTE ADULT - PROBLEM SELECTOR PLAN 2
- Meets sepsis criteria on admission. Likely secondary to PNA vs. r/o infected groin catheter  - S/p Vancomycin and Zosyn, 1.5L IVF in the ED. MAP >65  - Lactate 2.6 -> 1.6, RVP negative  - Continue Midodrine 10 TID  - Follow up blood cultures, urine cultures. Consider groin catheter removal if culture negative   - Continue Vancomycin by level, Zosyn renally dosed - Sepsis resolved, afebrile, BP improved   - S/p Vancomycin and Zosyn, 1.5L IVF in the ED.   - Lactate 2.6 -> 1.6, RVP negative  - Continue Midodrine 10 TID  - Follow up blood cultures, urine cultures. Consider groin catheter removal if culture negative - Sepsis resolved, afebrile, BP improved   - S/p Vancomycin and Zosyn, 1.5L IVF in the ED.   - Lactate 2.6 -> 1.6, RVP negative  - Continue Midodrine 10 TID  - Follow up blood cultures, urine cultures.

## 2019-11-24 NOTE — PROGRESS NOTE ADULT - PROBLEM SELECTOR PLAN 8
- Let patient and family know to follow up as outpatient - As per patient's sister, patient has hx of anal/rectal cancer with imaging 1 year ago showing no metastatic disease at the time. Current lung nodules and liver lesion seen on imaging may be due to underlying anal cancer.   - Let patient and family know to follow up as outpatient - As per patient's sister, patient has hx of anal/rectal cancer with imaging 1 year ago showing no metastatic disease at the time. Current lung nodules and liver lesion seen on imaging may be due to underlying anal cancer.

## 2019-11-24 NOTE — PROGRESS NOTE ADULT - PROBLEM SELECTOR PLAN 1
- Leukocytosis, fever, cough with bilateral lower lobe airspace consolidations on CT. RVP negative  - S/p Vancomycin and Zosyn in the ED  - Follow up blood cultures  - Continue Zosyn, azithro and vanc by level. De-escalate per cultures - Leukocytosis improving WBC 11.62 down from 12.02 yesterday, fever resolved, dry cough with bilateral lower lobe airspace consolidations seen on CT Chest. RVP negative  - S/p Vancomycin and Zosyn in the ED  - Follow up blood cultures  - Continue azithro 500mg q24 to cover atypical pneumonias, continue vancomycin 1g for possible infection source being groin catheter.   - Deescalate zosyn to ceftriaxone since no less likely concern for pseudomonas - Leukocytosis improving WBC 11.62 down from 12.02 yesterday, fever resolved, dry cough with bilateral lower lobe airspace consolidations seen on CT Chest. RVP negative  - S/p Vancomycin and Zosyn in the ED  - Follow up blood cultures  - Continue Zosyn 3.375 g q12 to cover CAP vs aspiration pneumonia  - Continue azithro 500mg q24 to cover atypical pneumonias,   - D/c vancomycin since groin catheter is not likely source of infection, no erythema or purulence around the catheter.

## 2019-11-24 NOTE — H&P ADULT - PROBLEM SELECTOR PLAN 5
- Continue home Depakote, Zyprexa - No overt sign of bleeding.   - Continue to monitor Hb  - Check B12, folate

## 2019-11-24 NOTE — H&P ADULT - NSHPLABSRESULTS_GEN_ALL_CORE
10.7   12. )-----------( 170      ( 2019 16:14 )             35.5     2019 16:14    136    |  94     |  13     ----------------------------<  88     3.8     |  27     |  2.39     Ca    9.4        2019 16:14    TPro  8.6    /  Alb  3.7    /  TBili  0.5    /  DBili  x      /  AST  27     /  ALT  8      /  AlkPhos  161    2019 16:14    LIVER FUNCTIONS - ( 2019 16:14 )  Alb: 3.7 g/dL / Pro: 8.6 g/dL / ALK PHOS: 161 U/L / ALT: 8 U/L / AST: 27 U/L / GGT: x           PT/INR - ( 2019 16:14 )   PT: 12.5 sec;   INR: 1.09 ratio         PTT - ( 2019 16:14 )  PTT:44.7 sec  CAPILLARY BLOOD GLUCOSE      POCT Blood Glucose.: 103 mg/dL (2019 15:02)        Urinalysis Basic - ( 2019 01:06 )    Color: Light Yellow / Appearance: Clear / S.007 / pH: x  Gluc: x / Ketone: Negative  / Bili: Negative / Urobili: Negative   Blood: x / Protein: 30 mg/dL / Nitrite: Negative   Leuk Esterase: Large / RBC: 5 /hpf / WBC 91 /HPF   Sq Epi: x / Non Sq Epi: 0 /hpf / Bacteria: Negative    EKG: No ST elevation or depression     < from: CT Chest No Cont (19 @ 22:07) >    IMPRESSION:     Bilateral lower lobe airspace consolidations which may represent a   combination of atelectasis and/or pneumonia.    Multiple bilateral pulmonary nodules measuring up to 7 mm. In the absence   of known malignancy, short-term follow-up in 3-6 months may be obtained   to demonstrate stability.    Multiple indeterminate liver lesions. Further evaluation with MRI there   is recommended, if no contraindications exist.    < end of copied text >

## 2019-11-24 NOTE — ED ADULT NURSE REASSESSMENT NOTE - NS ED NURSE REASSESS COMMENT FT1
Received patient in room 25. Denies any pain or discomfort at this time. 2LNC in use, v/s are stable. Right femoral shiley noted in place, intact. Patient had small amount formed stool, patient changed and cleaned. Skin is warm dry and intact. Awaiting bed assignment at this time.

## 2019-11-24 NOTE — PROGRESS NOTE ADULT - PROBLEM SELECTOR PLAN 9
- Let patient and family know to follow up as outpatient - CT chest shows new pulmonary nodules and liver lesions, likely due to metastatic disease from rectal cancer.  - Patient and family was informed regarding new imaging findings

## 2019-11-24 NOTE — PROGRESS NOTE ADULT - ATTENDING COMMENTS
Pt seen and examined in the presence of pt's sister, Dr. Oanh Day.   Pt denies cp, palpitations, sob, abd pain, N/V, fever, chills. Pt states she feels a lot better compared to when she originally presented.  Dr. Day mentioned concern for Aspiration PNA as this has occurred before. They have never had an official S/S evaluation as it will not change their preference of continuing regular diet for quality of life.   Vitals stable. Initially pt noted to be hypotensive, however, her BP is improved to baseline.  Physical exam remarkable for coarse breath sound b/l, RLE Groin catheter in place without tenderness to palpation, erythematous skin, underlying fluctuation or discharge noted.   Lab and imaging reviewed.   Pt presented with sepsis secondary to PNA, CAP vs aspiration PNA. Catheter site does not seem to be the source of infection. Pt is currently HDS  Will continue Zosyn and Azithromycin (3 doses)  Monitor vitals and bloodwork  Pt scheduled for HD on Tuesday and saturdays

## 2019-11-24 NOTE — H&P ADULT - NSICDXPASTMEDICALHX_GEN_ALL_CORE_FT
PAST MEDICAL HISTORY:  Acquired Nephrogenic Diabetes Insipidus secondary to lithium    Bipolar Disorder s/p high doses lithium and ECT in the past    Dementia     Hemodialysis     Past Heart Attack pt suggests that she had a small MI a few years ago; can't recall circumstances or symptoms; unclear if this was real or not

## 2019-11-24 NOTE — H&P ADULT - NSHPPHYSICALEXAM_GEN_ALL_CORE
Vital Signs (24 Hrs):  T(C): 37.2 (11-24-19 @ 02:12), Max: 39 (11-23-19 @ 16:06)  HR: 85 (11-24-19 @ 02:12) (76 - 104)  BP: 102/73 (11-24-19 @ 02:12) (70/52 - 103/69)  RR: 21 (11-24-19 @ 02:12) (18 - 24)  SpO2: 95% (11-24-19 @ 02:12) (92% - 100%)  Wt(kg): --    GENERAL: NAD  HEAD:  Atraumatic, Normocephalic  EYES: EOMI, conjunctiva and sclera clear  NECK: Supple, No JVD  CHEST/LUNG: Coarse breath sounds bilaterally, no crackles   HEART: Regular rate and rhythm; 2/6 systolic murmur   ABDOMEN: Soft, Nontender, Nondistended; Bowel sounds present  EXTREMITIES:  2+ Peripheral Pulses, No clubbing, cyanosis, or edema. Right groin catheter without erythema or drainage   PSYCH: AAOx3  NEUROLOGY: non-focal  SKIN: No rashes or lesions Vital Signs (24 Hrs):  T(C): 37.2 (11-24-19 @ 02:12), Max: 39 (11-23-19 @ 16:06)  HR: 85 (11-24-19 @ 02:12) (76 - 104)  BP: 102/73 (11-24-19 @ 02:12) (70/52 - 103/69)  RR: 21 (11-24-19 @ 02:12) (18 - 24)  SpO2: 95% (11-24-19 @ 02:12) (92% - 100%)  Wt(kg): --    GENERAL: NAD, comfortable eating chips  HEAD:  Atraumatic, Normocephalic  EYES: EOMI, conjunctiva and sclera clear  NECK: Supple, No JVD  CHEST/LUNG: Coarse breath sounds bilaterally, no crackles   HEART: Regular rate and rhythm; 2/6 systolic murmur   ABDOMEN: Soft, Nontender, Nondistended; Bowel sounds present  EXTREMITIES:  2+ Peripheral Pulses, No clubbing, cyanosis, or edema. Right groin catheter without erythema or drainage   PSYCH: AAOx3, no SI/HI  NEUROLOGY: non-focal, CNII-XII grossly intact  SKIN: No rashes or lesions

## 2019-11-24 NOTE — H&P ADULT - ASSESSMENT
59F w/ bipolar disorder, acquired nephrogenic diabetes and ESRD on HD ([place], Sat) secondary to lithium toxicity sent to the ED from dialysis center for hypotension and AMS during dialysis. Admitted for 59F w/ bipolar disorder, acquired nephrogenic diabetes and ESRD on HD (Tue, Sat) secondary to lithium toxicity sent to the ED from dialysis center for hypotension and AMS during dialysis. Admitted for severe sepsis likely due to PNA c/b sepsis encephalopathy 59F w/ bipolar disorder, acquired nephrogenic diabetes and ESRD on HD (Tue, Sat) secondary to lithium toxicity sent to the ED from dialysis center for hypotension and AMS during dialysis. Admitted for severe sepsis likely due to PNA c/b sepsis encephalopathy now resolved

## 2019-11-24 NOTE — PROGRESS NOTE ADULT - PROBLEM SELECTOR PLAN 5
- No overt sign of bleeding.   - Continue to monitor Hb  - Check B12, folate - Hgb 9.3, Likely due to hx of CKD, or vitamin deficiencies  - No overt sign of bleeding   - Continue to monitor Hb  - Need to determine baseline Hb  - Check B12, folate

## 2019-11-24 NOTE — PROGRESS NOTE ADULT - PROBLEM SELECTOR PLAN 7
- Patient not sure of all her meds. Called sister to get her meds, however she said she will bring a copy of her med list in the morning. Please follow up and update medrec

## 2019-11-24 NOTE — PROGRESS NOTE ADULT - SUBJECTIVE AND OBJECTIVE BOX
[facility]-Amanda Curry, NP- PROGRESS NOTE    Patient seen and examined in good spirits. No c/o pain, SOB and chills.      Hospital Medications:   MEDICATIONS  (STANDING):  diVALproex  milliGRAM(s) Oral two times a day  heparin  Injectable 5000 Unit(s) SubCutaneous every 8 hours  midodrine. 10 milliGRAM(s) Oral every 8 hours  piperacillin/tazobactam IVPB.. 3.375 Gram(s) IV Intermittent every 12 hours      REVIEW OF SYSTEMS:  As per HPI, 8 out of 10 ROS were unremarkable    VITALS:  T(F): 98.3 (19 @ 21:00), Max: 98.9 (19 @ 22:25)  HR: 62 (19 @ 21:00)  BP: 121/62 (19 @ 21:00)  RR: 18 (19 @ 21:00)  SpO2: 95% (19 @ 21:00)  Wt(kg): --     @ 07:01  -   @ 22:15  --------------------------------------------------------  IN: 600 mL / OUT: 1 mL / NET: 599 mL          PHYSICAL EXAM:  Constitutional: NAD  HEENT: MMM  Neck: No JVD  Respiratory: CTAB, no wheezes, rales or rhonchi  Cardiovascular: S1, S2, RRR  Gastrointestinal: BS+, soft, NT/ND  Extremities: No peripheral edema  Neurological: A/O x 3, no focal deficits  Psychiatric: Normal mood, normal affect  : No CVA tenderness. No bear.   Skin: No rashes  Vascular Access:R femoral HDC    LABS:  Blood Gas Source Venous: Venous ( @ 18:47)  Blood Gas Venous - Sodium: 138 mmoL/L ( @ 18:47)  Blood Gas Source Venous: Venous ( @ 16:14)  Blood Gas Venous - Sodium: 137 mmoL/L ( @ 16:14)          141  |  102  |  21  ----------------------------<  88  4.0   |  26  |  3.45<H>      136  |  94<L>  |  13  ----------------------------<  88  3.8   |  27  |  2.39<H>    Ca    8.8      2019 06:13  Phos  3.2       Mg     2.5         TPro  6.8  /  Alb  2.9<L>  /  TBili  0.3  /  DBili  x   /  AST  16  /  ALT  5<L>  /  AlkPhos  123<H>    TPro  8.6<H>  /  Alb  3.7  /  TBili  0.5  /  DBili  x   /  AST  27  /  ALT  8<L>  /  AlkPhos  161<H>                              9.3    11.62 )-----------( 164      ( 2019 09:09 )             31.3       Urine Studies:  Urinalysis Basic - ( 2019 01:06 )    Color: Light Yellow / Appearance: Clear / S.007 / pH: x  Gluc: x / Ketone: Negative  / Bili: Negative / Urobili: Negative   Blood: x / Protein: 30 mg/dL / Nitrite: Negative   Leuk Esterase: Large / RBC: 5 /hpf / WBC 91 /HPF   Sq Epi: x / Non Sq Epi: 0 /hpf / Bacteria: Negative

## 2019-11-24 NOTE — H&P ADULT - PROBLEM SELECTOR PLAN 3
- HD twice a week  - Monitor chemistry including K, Mg and replete to normal  - Avoid nephrotoxic agents-NSAIDs, contrast, nephrotoxic antibiotics  - Nephrology following

## 2019-11-24 NOTE — H&P ADULT - PROBLEM SELECTOR PLAN 1
- Leukocytosis, fever, cough with bilateral lower lobe airspace consolidations on CT. RVP negative  - S/p Vancomycin and Zosyn in the ED  - Follow up blood cultures  - Continue Zosyn, azithro and vanc by level. De-escalate per cultures and urine legionella - Leukocytosis, fever, cough with bilateral lower lobe airspace consolidations on CT. RVP negative  - S/p Vancomycin and Zosyn in the ED  - Follow up blood cultures  - Continue Zosyn, azithro and vanc by level. De-escalate per cultures

## 2019-11-24 NOTE — PROGRESS NOTE ADULT - PROBLEM SELECTOR PLAN 3
- HD twice a week (Tues, Sat)  - Monitor chemistry including K, Mg and replete to normal  - Avoid nephrotoxic agents-NSAIDs, contrast, nephrotoxic antibiotics  - Nephrology following, no emergent need for HD today  - Dialysis access via groin catheter, no signs of being infected - HD twice a week (Tues, Sat)  - electrolytes wnl, no signs of volume overload or uremia  - Monitor chemistry including K, Mg and replete to normal  - Avoid nephrotoxic agents-NSAIDs, contrast, nephrotoxic antibiotics  - Nephrology following, no emergent need for HD today  - Dialysis access via groin catheter, no signs of being infected

## 2019-11-24 NOTE — H&P ADULT - PROBLEM SELECTOR PLAN 2
- Meets sepsis criteria on admission. Likely secondary to PNA  - S/p Vancomycin and Zosyn, 1.5L IVF in the ED. MAP >65  - Lactate 2.6 -> 1.6, RVP negative  - Follow up blood cultures, urine cultures. Consider groin catheter removal if culture negative   - Continue Vancomycin by level, Zosyn renally dosed and Azithromycin - Meets sepsis criteria on admission. Likely secondary to PNA  - S/p Vancomycin and Zosyn, 1.5L IVF in the ED. MAP >65  - Lactate 2.6 -> 1.6, RVP negative  - Continue Midodrine 10 TID  - Follow up blood cultures, urine cultures. Consider groin catheter removal if culture negative   - Continue Vancomycin by level, Zosyn renally dosed and Azithromycin - Meets sepsis criteria on admission. Likely secondary to PNA  - S/p Vancomycin and Zosyn, 1.5L IVF in the ED. MAP >65  - Lactate 2.6 -> 1.6, RVP negative  - Continue Midodrine 10 TID  - Follow up blood cultures, urine cultures. Consider groin catheter removal if culture negative   - Continue Vancomycin by level, Zosyn renally dosed

## 2019-11-24 NOTE — PROGRESS NOTE ADULT - ASSESSMENT
Ms. Day is a 59 year-old woman with history of multiple medical issues including bipolar disease, lithium-induced nephrogenic diabetes insipidus, and end stage renal disease. She undergoes hemodialysis at [facility], Tuesdays and Saturdays, under the care of Dr. Edgar Hilton. She was last dialyzed yesterday. After dialysis, she was noted to be confused and was complaining of cough/generalized weakness/malaise.     (1)Renal - ESRD - HD [facility] on Tuesdays and Saturdays; s/p HD yesterday; likely will not require further dialysis until Tuesday 11/26  (2)CV - hypotension - BP improved.   (3)ID- treated for CAP vs aspiration PNA. WBC count down. Blood cultures. Afebrile.     RECOMMEND:  (1) HD Tuesday  (2)Dose new meds for GFR<10/HD  (3)IV antibiotics

## 2019-11-24 NOTE — PROGRESS NOTE ADULT - PROBLEM SELECTOR PLAN 10
Transitions of Care Status:  1.  Name of PCP: Santos Mckeon / Renata Day ([facility] Physician)  2.  PCP Contacted on Admission: [ ] Y    [ ] N    3.  PCP contacted at Discharge: [ ] Y    [ ] N    [ ] N/A  4.  Post-Discharge Appointment Date and Location:  5.  Summary of Handoff given to PCP:

## 2019-11-24 NOTE — PROGRESS NOTE ADULT - SUBJECTIVE AND OBJECTIVE BOX
Chief Complaint: Patient is a 59y old  Female who presents with a chief complaint of Altered Mentation (2019 02:43)      INTERVAL HPI/OVERNIGHT EVENTS:   Patient is seen at bedside in no acute distress. Patient reports that she is feeling much improved since coming to the hospital and is breathing better. Patient reports that she has had prior episodes of low blood pressure and becoming lightheaded during dialysis sessions but generally is able to recover on her own. Patient reports that she has had right groin catheter for dialysis access for years since her AV fistula never worked. As per patient, the groin catheter has never become infected and cannot remember the last time it has been replaced. Patient has been urinating without difficulty. Denies chest pain, shortness of breath, fever or chills.      REVIEW OF SYSTEMS:   CONSTITUTIONAL:  Denies fever/chills, fatigue, weight loss.  Eyes:  Denies visual loss, blurred vision, double vision or scleral icterus.  Ears, Nose, Throat:  Denies hearing loss, sneezing, congestion, runny nose or sore throat.  SKIN:  Denies rash or itching.  CARDIOVASCULAR:  Denies chest pain, IVIS  RESPIRATORY:  Denies shortness of breath, Positive dry cough.  GASTROINTESTINAL:  Denies anorexia, nausea, vomiting or diarrhea. No abdominal pain or blood.  GENITOURINARY:  Denies any hematuria, dysuria  NEUROLOGICAL:  Denies headache, dizziness, syncope, paralysis, ataxia, numbness or tingling in the extremities. No change in bowel or bladder control.  MUSCULOSKELETAL:  Denies muscle, back pain, joint pain or stiffness.  HEMATOLOGIC:  Denies anemia, bleeding or bruising.  LYMPHATICS:  Denies enlarged nodes.   PSYCHIATRIC:  Denies symptoms of depression or anxiety.  ENDOCRINOLOGIC:  Denies reports of sweating, cold or heat intolerance. No polyuria or polydipsia.      MEDICATIONS  (STANDING):  diVALproex  milliGRAM(s) Oral two times a day  heparin  Injectable 5000 Unit(s) SubCutaneous every 8 hours  midodrine. 10 milliGRAM(s) Oral every 8 hours  OLANZapine 5 milliGRAM(s) Oral daily  piperacillin/tazobactam IVPB.. 3.375 Gram(s) IV Intermittent every 12 hours    MEDICATIONS  (PRN):      Vital Signs Last 24 Hrs  T(C): 36.9 (2019 08:02), Max: 39 (2019 16:06)  T(F): 98.5 (2019 08:02), Max: 102.2 (2019 16:06)  HR: 76 (2019 08:02) (76 - 104)  BP: 107/69 (2019 08:02) (70/52 - 107/69)  BP(mean): --  RR: 18 (2019 08:02) (18 - 24)  SpO2: 98% (2019 08:02) (92% - 100%)  Supplemental O2: [ ] No, on Room Air [ ] Yes,     I&O's Detail    CAPILLARY BLOOD GLUCOSE      POCT Blood Glucose.: 103 mg/dL (2019 15:02)      PHYSICAL EXAM:    GENERAL: NAD  HEAD:  NC/AT  EYES: EOMI, white sclerae  ENMT: MMM, no oropharyngeal lesions or erythema appreciated, dentition well appearing  Neck: trachea midline, no appreciable masses  Pulm: normal work of breathing, CTA b/l  CV: S1&S2+, RRR, no murmurs, rubs or gallops  ABDOMEN: soft, nontender, nondistended  : no cva tenderness, no bear placed, right groin catheter in place no purulence or erythema noted  EXTREMITIES:  no appreciable edema in b/l LE, old AV fistula in left upper extremity  Neuro: A&Ox3, no focal deficits  SKIN: warm and dry, no visible rash    LABS:                        10.7   12.02 )-----------( 170      ( 2019 16:14 )             35.5     11-24    141  |  102  |  21  ----------------------------<  88  4.0   |  26  |  3.45<H>    Ca    8.8      2019 06:13  Phos  3.2     11-24  Mg     2.5     11-24    TPro  6.8  /  Alb  2.9<L>  /  TBili  0.3  /  DBili  x   /  AST  16  /  ALT  5<L>  /  AlkPhos  123<H>  11-24    LIVER FUNCTIONS - ( 2019 06:13 )  Alb: 2.9 g/dL / Pro: 6.8 g/dL / ALK PHOS: 123 U/L / ALT: 5 U/L / AST: 16 U/L / GGT: x     / T. Bili 0.3 mg/dL / D. Bili x         PT/INR - ( 2019 16:14 )   PT: 12.5 sec;   INR: 1.09 ratio         PTT - ( 2019 16:14 )  PTT:44.7 sec  Urinalysis Basic - ( 2019 01:06 )    Color: Light Yellow / Appearance: Clear / S.007 / pH: x  Gluc: x / Ketone: Negative  / Bili: Negative / Urobili: Negative   Blood: x / Protein: 30 mg/dL / Nitrite: Negative   Leuk Esterase: Large / RBC: 5 /hpf / WBC 91 /HPF   Sq Epi: x / Non Sq Epi: 0 /hpf / Bacteria: Negative              Microbiology:    RADIOLOGY & ADDITIONAL TESTS:    EXAM:  CT CHEST                        PROCEDURE DATE:  2019    IMPRESSION:   Bilateral lower lobe airspace consolidations which may represent a   combination of atelectasis and/or pneumonia.    Multiple bilateral pulmonary nodules measuring up to 7 mm. In the absence   of known malignancy, short-term follow-up in 3-6 months may be obtained   to demonstrate stability.    Multiple indeterminate liver lesions. Further evaluation with MRI there   is recommended, if no contraindications exist.      EXAM:  XR CHEST AP OR PA 1V                        PROCEDURE DATE:  2019    ******PRELIMINARY REPORT******          INTERPRETATION:  linear basilar atelectasis  no focal consolidation Chief Complaint: Patient is a 59y old  Female who presents with a chief complaint of Altered Mentation (2019 02:43)      INTERVAL HPI/OVERNIGHT EVENTS:   Patient is seen at bedside in no acute distress. Patient reports that she is feeling much improved since coming to the hospital and is breathing better. Patient reports that she has had prior episodes of low blood pressure and becoming lightheaded during dialysis sessions but generally is able to recover on her own. Patient reports that she has had right groin catheter for dialysis access for years since her AV fistula never worked. As per patient, the groin catheter has never become infected and cannot remember the last time it has been replaced. Patient has been urinating without difficulty. Denies chest pain, shortness of breath, fever or chills.    Patient's sister, Dr. Renata Alston, was present at bedside, and elaborated on patient's history. As per patient's sister, multiple AV fistulas have been attempted in the past for dialysis access, including both upper extremities as well as in left IJ with no success. Patient has had right groin catheter in place for the last year with no signs of infection. Patient's sister reports that the patient has had multiple admissions at [facility] for likely aspiration pneumonia as patient is at risk due to her psychiatric history.     REVIEW OF SYSTEMS:   CONSTITUTIONAL:  Denies fever/chills, fatigue, weight loss.  Eyes:  Denies visual loss, blurred vision, double vision or scleral icterus.  Ears, Nose, Throat:  Denies hearing loss, sneezing, congestion, runny nose or sore throat.  SKIN:  Denies rash or itching.  CARDIOVASCULAR:  Denies chest pain, IVIS  RESPIRATORY:  Denies shortness of breath, Positive dry cough.  GASTROINTESTINAL:  Denies anorexia, nausea, vomiting or diarrhea. No abdominal pain or blood.  GENITOURINARY:  Denies any hematuria, dysuria  NEUROLOGICAL:  Denies headache, dizziness, syncope, paralysis, ataxia, numbness or tingling in the extremities. No change in bowel or bladder control.  MUSCULOSKELETAL:  Denies muscle, back pain, joint pain or stiffness.  HEMATOLOGIC:  Denies anemia, bleeding or bruising.  LYMPHATICS:  Denies enlarged nodes.   PSYCHIATRIC:  Denies symptoms of depression or anxiety.  ENDOCRINOLOGIC:  Denies reports of sweating, cold or heat intolerance. No polyuria or polydipsia.      MEDICATIONS  (STANDING):  diVALproex  milliGRAM(s) Oral two times a day  heparin  Injectable 5000 Unit(s) SubCutaneous every 8 hours  midodrine. 10 milliGRAM(s) Oral every 8 hours  OLANZapine 5 milliGRAM(s) Oral daily  piperacillin/tazobactam IVPB.. 3.375 Gram(s) IV Intermittent every 12 hours    MEDICATIONS  (PRN):      Vital Signs Last 24 Hrs  T(C): 36.9 (2019 08:02), Max: 39 (2019 16:06)  T(F): 98.5 (2019 08:02), Max: 102.2 (2019 16:06)  HR: 76 (2019 08:02) (76 - 104)  BP: 107/69 (2019 08:02) (70/52 - 107/69)  BP(mean): --  RR: 18 (2019 08:02) (18 - 24)  SpO2: 98% (2019 08:02) (92% - 100%)  Supplemental O2: [ ] No, on Room Air [ ] Yes,     I&O's Detail    CAPILLARY BLOOD GLUCOSE      POCT Blood Glucose.: 103 mg/dL (2019 15:02)      PHYSICAL EXAM:    GENERAL: NAD  HEAD:  NC/AT  EYES: EOMI, white sclerae  ENMT: MMM, no oropharyngeal lesions or erythema appreciated, dentition well appearing  Neck: trachea midline, no appreciable masses  Pulm: normal work of breathing, CTA b/l  CV: S1&S2+, RRR, no murmurs, rubs or gallops  ABDOMEN: soft, nontender, nondistended  : no cva tenderness, no bear placed, right groin catheter in place no purulence or erythema noted  EXTREMITIES:  no appreciable edema in b/l LE, old AV fistula in left upper extremity  Neuro: A&Ox3, no focal deficits  SKIN: warm and dry, no visible rash    LABS:                        10.7   12.02 )-----------( 170      ( 2019 16:14 )             35.5     11-24    141  |  102  |  21  ----------------------------<  88  4.0   |  26  |  3.45<H>    Ca    8.8      2019 06:13  Phos  3.2     -  Mg     2.5     -24    TPro  6.8  /  Alb  2.9<L>  /  TBili  0.3  /  DBili  x   /  AST  16  /  ALT  5<L>  /  AlkPhos  123<H>      LIVER FUNCTIONS - ( 2019 06:13 )  Alb: 2.9 g/dL / Pro: 6.8 g/dL / ALK PHOS: 123 U/L / ALT: 5 U/L / AST: 16 U/L / GGT: x     / T. Bili 0.3 mg/dL / D. Bili x         PT/INR - ( 2019 16:14 )   PT: 12.5 sec;   INR: 1.09 ratio         PTT - ( 2019 16:14 )  PTT:44.7 sec  Urinalysis Basic - ( 2019 01:06 )    Color: Light Yellow / Appearance: Clear / S.007 / pH: x  Gluc: x / Ketone: Negative  / Bili: Negative / Urobili: Negative   Blood: x / Protein: 30 mg/dL / Nitrite: Negative   Leuk Esterase: Large / RBC: 5 /hpf / WBC 91 /HPF   Sq Epi: x / Non Sq Epi: 0 /hpf / Bacteria: Negative              Microbiology:    RADIOLOGY & ADDITIONAL TESTS:    EXAM:  CT CHEST                        PROCEDURE DATE:  2019    IMPRESSION:   Bilateral lower lobe airspace consolidations which may represent a   combination of atelectasis and/or pneumonia.    Multiple bilateral pulmonary nodules measuring up to 7 mm. In the absence   of known malignancy, short-term follow-up in 3-6 months may be obtained   to demonstrate stability.    Multiple indeterminate liver lesions. Further evaluation with MRI there   is recommended, if no contraindications exist.      EXAM:  XR CHEST AP OR PA 1V                        PROCEDURE DATE:  2019    ******PRELIMINARY REPORT******          INTERPRETATION:  linear basilar atelectasis  no focal consolidation Chief Complaint: Patient is a 59y old  Female who presents with a chief complaint of Altered Mentation (2019 02:43)      INTERVAL HPI/OVERNIGHT EVENTS:   Patient is seen at bedside in no acute distress. Patient reports that she is feeling much improved since coming to the hospital and is breathing better. Patient reports that she has had prior episodes of low blood pressure and becoming lightheaded during dialysis sessions but generally is able to recover on her own. Patient reports that she has had right groin catheter for dialysis access for years since her AV fistula never worked. As per patient, the groin catheter has never become infected and cannot remember the last time it has been replaced. Patient has been urinating without difficulty. Denies chest pain, shortness of breath, fever or chills.    Patient's sister, Dr. Renata Alston ([phone]), was present at bedside, and elaborated on patient's history. As per patient's sister, multiple AV fistulas have been attempted in the past for dialysis access, including both upper extremities as well as in left IJ with no success. Patient has had right groin catheter in place for the last year with no signs of infection. Patient's sister reports that the patient has had multiple admissions at [facility] for likely aspiration pneumonia as patient is at risk due to her psychiatric history.     REVIEW OF SYSTEMS:   CONSTITUTIONAL:  Denies fever/chills, fatigue, weight loss.  Eyes:  Denies visual loss, blurred vision, double vision or scleral icterus.  Ears, Nose, Throat:  Denies hearing loss, sneezing, congestion, runny nose or sore throat.  SKIN:  Denies rash or itching.  CARDIOVASCULAR:  Denies chest pain, IVIS  RESPIRATORY:  Denies shortness of breath, Positive dry cough.  GASTROINTESTINAL:  Denies anorexia, nausea, vomiting or diarrhea. No abdominal pain or blood.  GENITOURINARY:  Denies any hematuria, dysuria  NEUROLOGICAL:  Denies headache, dizziness, syncope, paralysis, ataxia, numbness or tingling in the extremities. No change in bowel or bladder control.  MUSCULOSKELETAL:  Denies muscle, back pain, joint pain or stiffness.  HEMATOLOGIC:  Denies anemia, bleeding or bruising.  LYMPHATICS:  Denies enlarged nodes.   PSYCHIATRIC:  Denies symptoms of depression or anxiety.  ENDOCRINOLOGIC:  Denies reports of sweating, cold or heat intolerance. No polyuria or polydipsia.      MEDICATIONS  (STANDING):  diVALproex  milliGRAM(s) Oral two times a day  heparin  Injectable 5000 Unit(s) SubCutaneous every 8 hours  midodrine. 10 milliGRAM(s) Oral every 8 hours  OLANZapine 5 milliGRAM(s) Oral daily  piperacillin/tazobactam IVPB.. 3.375 Gram(s) IV Intermittent every 12 hours    MEDICATIONS  (PRN):      Vital Signs Last 24 Hrs  T(C): 36.9 (2019 08:02), Max: 39 (2019 16:06)  T(F): 98.5 (2019 08:02), Max: 102.2 (2019 16:06)  HR: 76 (2019 08:02) (76 - 104)  BP: 107/69 (2019 08:02) (70/52 - 107/69)  BP(mean): --  RR: 18 (2019 08:02) (18 - 24)  SpO2: 98% (2019 08:02) (92% - 100%)  Supplemental O2: [ ] No, on Room Air [ ] Yes,     I&O's Detail    CAPILLARY BLOOD GLUCOSE      POCT Blood Glucose.: 103 mg/dL (2019 15:02)      PHYSICAL EXAM:    GENERAL: NAD  HEAD:  NC/AT  EYES: EOMI, white sclerae  ENMT: MMM, no oropharyngeal lesions or erythema appreciated, dentition well appearing  Neck: trachea midline, no appreciable masses  Pulm: normal work of breathing, CTA b/l  CV: S1&S2+, RRR, no murmurs, rubs or gallops  ABDOMEN: soft, nontender, nondistended  : no cva tenderness, no bear placed, right groin catheter in place no purulence or erythema noted  EXTREMITIES:  no appreciable edema in b/l LE, old AV fistula in left upper extremity  Neuro: A&Ox3, no focal deficits  SKIN: warm and dry, no visible rash    LABS:                        10.7   12.02 )-----------( 170      ( 2019 16:14 )             35.5     11-24    141  |  102  |  21  ----------------------------<  88  4.0   |  26  |  3.45<H>    Ca    8.8      2019 06:13  Phos  3.2     -  Mg     2.5     -24    TPro  6.8  /  Alb  2.9<L>  /  TBili  0.3  /  DBili  x   /  AST  16  /  ALT  5<L>  /  AlkPhos  123<H>  -24    LIVER FUNCTIONS - ( 2019 06:13 )  Alb: 2.9 g/dL / Pro: 6.8 g/dL / ALK PHOS: 123 U/L / ALT: 5 U/L / AST: 16 U/L / GGT: x     / T. Bili 0.3 mg/dL / D. Bili x         PT/INR - ( 2019 16:14 )   PT: 12.5 sec;   INR: 1.09 ratio         PTT - ( 2019 16:14 )  PTT:44.7 sec  Urinalysis Basic - ( 2019 01:06 )    Color: Light Yellow / Appearance: Clear / S.007 / pH: x  Gluc: x / Ketone: Negative  / Bili: Negative / Urobili: Negative   Blood: x / Protein: 30 mg/dL / Nitrite: Negative   Leuk Esterase: Large / RBC: 5 /hpf / WBC 91 /HPF   Sq Epi: x / Non Sq Epi: 0 /hpf / Bacteria: Negative              Microbiology:    RADIOLOGY & ADDITIONAL TESTS:    EXAM:  CT CHEST                        PROCEDURE DATE:  2019    IMPRESSION:   Bilateral lower lobe airspace consolidations which may represent a   combination of atelectasis and/or pneumonia.    Multiple bilateral pulmonary nodules measuring up to 7 mm. In the absence   of known malignancy, short-term follow-up in 3-6 months may be obtained   to demonstrate stability.    Multiple indeterminate liver lesions. Further evaluation with MRI there   is recommended, if no contraindications exist.      EXAM:  XR CHEST AP OR PA 1V                        PROCEDURE DATE:  2019    ******PRELIMINARY REPORT******          INTERPRETATION:  linear basilar atelectasis  no focal consolidation

## 2019-11-24 NOTE — CHART NOTE - NSCHARTNOTEFT_GEN_A_CORE
Spoke to Dr. Oanh Day patient's sister and physician affiliated with [facility]. Update given by me. Further history: patient with history of anal/rectal cancer with imaging approximately one year prior showing no metastatic disease. She was informed CT chest shows pulmonary nodules and liver lesions. Patient reassigned from ADS to Team 4. We will look at rectum when patient upstairs. Promised to keep Dr. Day informed.

## 2019-11-24 NOTE — H&P ADULT - PROBLEM SELECTOR PLAN 10
Transitions of Care Status:  1.  Name of PCP: Santos Mckeon / Renata Day (Sister)  2.  PCP Contacted on Admission: [ ] Y    [ ] N    3.  PCP contacted at Discharge: [ ] Y    [ ] N    [ ] N/A  4.  Post-Discharge Appointment Date and Location:  5.  Summary of Handoff given to PCP: Transitions of Care Status:  1.  Name of PCP: Santos Mckeon / Renata Day ([facility] Physician)  2.  PCP Contacted on Admission: [ ] Y    [ ] N    3.  PCP contacted at Discharge: [ ] Y    [ ] N    [ ] N/A  4.  Post-Discharge Appointment Date and Location:  5.  Summary of Handoff given to PCP:

## 2019-11-24 NOTE — PROGRESS NOTE ADULT - ASSESSMENT
59F w/ bipolar disorder, acquired nephrogenic diabetes and ESRD on HD (Tue, Sat) secondary to lithium toxicity sent to the ED from dialysis center for hypotension and AMS during dialysis. Admitted for severe sepsis likely due to PNA c/b sepsis encephalopathy now resolved. 59F w/ bipolar disorder, acquired nephrogenic diabetes and ESRD on HD (Tue, Sat) secondary to lithium toxicity sent to the ED from dialysis center for hypotension and AMS during dialysis. Admitted for severe sepsis likely due to PNA c/b sepsis encephalopathy, sepsis now has resolved. 59F w/ bipolar disorder, acquired nephrogenic diabetes and ESRD on HD (Tue, Sat) secondary to lithium toxicity sent to the ED from dialysis center for hypotension and AMS during dialysis. Admitted for severe sepsis likely due to aspiration PNA vs CAP c/b sepsis encephalopathy, sepsis now has resolved.

## 2019-11-25 ENCOUNTER — TRANSCRIPTION ENCOUNTER (OUTPATIENT)
Age: 59
End: 2019-11-25

## 2019-11-25 LAB
ALBUMIN SERPL ELPH-MCNC: 2.8 G/DL — LOW (ref 3.3–5)
ALP SERPL-CCNC: 117 U/L — SIGNIFICANT CHANGE UP (ref 40–120)
ALT FLD-CCNC: 6 U/L — LOW (ref 10–45)
ANION GAP SERPL CALC-SCNC: 11 MMOL/L — SIGNIFICANT CHANGE UP (ref 5–17)
AST SERPL-CCNC: 14 U/L — SIGNIFICANT CHANGE UP (ref 10–40)
BILIRUB SERPL-MCNC: 0.2 MG/DL — SIGNIFICANT CHANGE UP (ref 0.2–1.2)
BUN SERPL-MCNC: 32 MG/DL — HIGH (ref 7–23)
CALCIUM SERPL-MCNC: 8.7 MG/DL — SIGNIFICANT CHANGE UP (ref 8.4–10.5)
CHLORIDE SERPL-SCNC: 107 MMOL/L — SIGNIFICANT CHANGE UP (ref 96–108)
CO2 SERPL-SCNC: 24 MMOL/L — SIGNIFICANT CHANGE UP (ref 22–31)
CREAT SERPL-MCNC: 4.92 MG/DL — HIGH (ref 0.5–1.3)
CULTURE RESULTS: SIGNIFICANT CHANGE UP
GLUCOSE SERPL-MCNC: 76 MG/DL — SIGNIFICANT CHANGE UP (ref 70–99)
HCT VFR BLD CALC: 32 % — LOW (ref 34.5–45)
HGB BLD-MCNC: 9.3 G/DL — LOW (ref 11.5–15.5)
MAGNESIUM SERPL-MCNC: 2.8 MG/DL — HIGH (ref 1.6–2.6)
MCHC RBC-ENTMCNC: 29.1 GM/DL — LOW (ref 32–36)
MCHC RBC-ENTMCNC: 33.5 PG — SIGNIFICANT CHANGE UP (ref 27–34)
MCV RBC AUTO: 115.1 FL — HIGH (ref 80–100)
PHOSPHATE SERPL-MCNC: 4.9 MG/DL — HIGH (ref 2.5–4.5)
PLATELET # BLD AUTO: 154 K/UL — SIGNIFICANT CHANGE UP (ref 150–400)
POTASSIUM SERPL-MCNC: 4.3 MMOL/L — SIGNIFICANT CHANGE UP (ref 3.5–5.3)
POTASSIUM SERPL-SCNC: 4.3 MMOL/L — SIGNIFICANT CHANGE UP (ref 3.5–5.3)
PROT SERPL-MCNC: 6.6 G/DL — SIGNIFICANT CHANGE UP (ref 6–8.3)
RBC # BLD: 2.78 M/UL — LOW (ref 3.8–5.2)
RBC # FLD: 16 % — HIGH (ref 10.3–14.5)
SODIUM SERPL-SCNC: 142 MMOL/L — SIGNIFICANT CHANGE UP (ref 135–145)
SPECIMEN SOURCE: SIGNIFICANT CHANGE UP
WBC # BLD: 7.31 K/UL — SIGNIFICANT CHANGE UP (ref 3.8–10.5)
WBC # FLD AUTO: 7.31 K/UL — SIGNIFICANT CHANGE UP (ref 3.8–10.5)

## 2019-11-25 PROCEDURE — 99233 SBSQ HOSP IP/OBS HIGH 50: CPT | Mod: GC

## 2019-11-25 RX ORDER — DARBEPOETIN ALFA IN POLYSORBAT 200MCG/0.4
40 PEN INJECTOR (ML) SUBCUTANEOUS ONCE
Refills: 0 | Status: COMPLETED | OUTPATIENT
Start: 2019-11-26 | End: 2019-11-26

## 2019-11-25 RX ADMIN — OLANZAPINE 10 MILLIGRAM(S): 15 TABLET, FILM COATED ORAL at 11:18

## 2019-11-25 RX ADMIN — HEPARIN SODIUM 5000 UNIT(S): 5000 INJECTION INTRAVENOUS; SUBCUTANEOUS at 05:47

## 2019-11-25 RX ADMIN — MIDODRINE HYDROCHLORIDE 10 MILLIGRAM(S): 2.5 TABLET ORAL at 21:53

## 2019-11-25 RX ADMIN — MIDODRINE HYDROCHLORIDE 10 MILLIGRAM(S): 2.5 TABLET ORAL at 05:47

## 2019-11-25 RX ADMIN — HEPARIN SODIUM 5000 UNIT(S): 5000 INJECTION INTRAVENOUS; SUBCUTANEOUS at 21:38

## 2019-11-25 RX ADMIN — HEPARIN SODIUM 5000 UNIT(S): 5000 INJECTION INTRAVENOUS; SUBCUTANEOUS at 14:44

## 2019-11-25 RX ADMIN — DIVALPROEX SODIUM 500 MILLIGRAM(S): 500 TABLET, DELAYED RELEASE ORAL at 05:47

## 2019-11-25 RX ADMIN — DIVALPROEX SODIUM 500 MILLIGRAM(S): 500 TABLET, DELAYED RELEASE ORAL at 17:30

## 2019-11-25 RX ADMIN — PIPERACILLIN AND TAZOBACTAM 25 GRAM(S): 4; .5 INJECTION, POWDER, LYOPHILIZED, FOR SOLUTION INTRAVENOUS at 17:30

## 2019-11-25 RX ADMIN — MIDODRINE HYDROCHLORIDE 10 MILLIGRAM(S): 2.5 TABLET ORAL at 14:44

## 2019-11-25 RX ADMIN — PIPERACILLIN AND TAZOBACTAM 25 GRAM(S): 4; .5 INJECTION, POWDER, LYOPHILIZED, FOR SOLUTION INTRAVENOUS at 05:48

## 2019-11-25 NOTE — PROGRESS NOTE ADULT - ASSESSMENT
59F w/ bipolar disorder, acquired nephrogenic diabetes and ESRD on HD (Tue, Sat) secondary to lithium toxicity sent to the ED from dialysis center for hypotension and AMS during dialysis. Admitted for severe sepsis likely due to aspiration PNA vs CAP c/b sepsis encephalopathy, sepsis now has resolved.

## 2019-11-25 NOTE — DISCHARGE NOTE PROVIDER - NSDCCPCAREPLAN_GEN_ALL_CORE_FT
PRINCIPAL DISCHARGE DIAGNOSIS  Diagnosis: PNA (pneumonia)  Assessment and Plan of Treatment: When you came to the hospital you had a fever and elevated white blood cell count, indicating that you had an infection. You underwent CT imaging showing that there was likely a bacterial pneumonia infection in your lungs. You were started on antibiotics and you improved. You should continue taking these antibiotics after you leave the hospital and you should follow up with your primary care doctor to monitor for any signs of recurring infection. PRINCIPAL DISCHARGE DIAGNOSIS  Diagnosis: Sepsis with encephalopathy  Assessment and Plan of Treatment: When you presented to the hospital you were confused and your mental status was altered. This was likely due to your low blood pressure and fever that you came to the hospital with. You were given intravenous fluids and antibiotics and your blood pressure and fever improved, and you were no longer confused. Your mental status returned back to your baseline. You should continue to take the antibiotics and follow up with your primary care doctor to monitor your blood pressure.      SECONDARY DISCHARGE DIAGNOSES  Diagnosis: PNA (pneumonia)  Assessment and Plan of Treatment: When you came to the hospital you had a fever and elevated white blood cell count, indicating that you had an infection. You underwent CT imaging showing that there was likely a bacterial pneumonia infection in your lungs. You were started on antibiotics and you improved. You should continue taking these antibiotics after you leave the hospital and you should follow up with your primary care doctor to monitor for any signs of recurring infection.    Diagnosis: ESRD (end stage renal disease) on dialysis  Assessment and Plan of Treatment: You have a past medical history of end stage renal disease on dialysis every Tuesday and Saturday. During your hospital course your electrolytes remained within normal limits and you did not require emergent dialysis. You did have one inpatient session of dialysis as per your regular schedule. You should continue to follow with your kidney doctor and resume your regular hemodialysis schedule as an outpatient.    Diagnosis: Incidental lung nodule, > 3mm and < 8mm  Assessment and Plan of Treatment: When you presented to the hospital you had CT imaging of your chest to evaluate for pneumonia. In addition, the imaging showed some incidental findings including multiple bilateral pulmonary nodules measuring up to 7 mm as well as multiple liver lesions. You have a history of rectal cancer and it is possible that these imaging findings represent spread of your underlying cancer. You should follow up with primary care doctor and oncologist with regards to treatment for these findings. PRINCIPAL DISCHARGE DIAGNOSIS  Diagnosis: Sepsis with encephalopathy  Assessment and Plan of Treatment: When you presented to the hospital you were confused and your mental status was altered. This was likely due to your low blood pressure and fever that you came to the hospital with. You were given intravenous fluids and antibiotics and your blood pressure and fever improved, and you were no longer confused. Your mental status returned back to your baseline. You were given midodrine to increase your blood pressure. You should continue to take the antibiotics and midodrine and follow up with your primary care doctor to monitor your blood pressure.      SECONDARY DISCHARGE DIAGNOSES  Diagnosis: PNA (pneumonia)  Assessment and Plan of Treatment: When you came to the hospital you had a fever and elevated white blood cell count, indicating that you had an infection. You underwent CT imaging showing that there was likely a bacterial pneumonia infection in your lungs. You were started on intravenous antibiotics, zosyn, and you improved. You were then transitioned to an oral form of antibiotics, Levoquin, and you should continue taking these antibiotics after you leave the hospital for 3 more days for a total of a 7 day course. You should follow up with your primary care doctor to monitor for any signs of recurring infection.    Diagnosis: ESRD (end stage renal disease) on dialysis  Assessment and Plan of Treatment: You have a past medical history of end stage renal disease on dialysis every Tuesday and Saturday. During your hospital course your electrolytes remained within normal limits and you did not require emergent dialysis. You did have one inpatient session of dialysis as per your regular schedule. You should continue to follow with your kidney doctor and resume your regular hemodialysis schedule as an outpatient.    Diagnosis: Incidental lung nodule, > 3mm and < 8mm  Assessment and Plan of Treatment: When you presented to the hospital you had CT imaging of your chest to evaluate for pneumonia. In addition, the imaging showed some incidental findings including multiple bilateral pulmonary nodules measuring up to 7 mm as well as multiple liver lesions. You have a history of rectal cancer and it is possible that these imaging findings represent spread of your underlying cancer. You should follow up with primary care doctor and oncologist with regards to treatment for these findings. PRINCIPAL DISCHARGE DIAGNOSIS  Diagnosis: Sepsis with encephalopathy  Assessment and Plan of Treatment: When you presented to the hospital you were confused and your mental status was altered. This was likely due to your low blood pressure and fever that you came to the hospital with. You were given intravenous fluids and antibiotics and your blood pressure and fever improved, and you were no longer confused. Your mental status returned back to your baseline. You were given midodrine to increase your blood pressure. You should continue to take the antibiotics and midodrine and follow up with your primary care doctor to monitor your blood pressure.      SECONDARY DISCHARGE DIAGNOSES  Diagnosis: PNA (pneumonia)  Assessment and Plan of Treatment: When you came to the hospital you had a fever and elevated white blood cell count, indicating that you had an infection. You underwent CT imaging showing that there was likely a bacterial pneumonia infection in your lungs. You were started on intravenous antibiotics, zosyn, and you improved. You were then transitioned to an oral form of antibiotics, Levoquin, and you should continue taking these antibiotics after you leave the hospital for 3 more days for a total of a 7 day course. You should follow up with your primary care doctor to monitor for any signs of recurring infection.    Diagnosis: ESRD (end stage renal disease) on dialysis  Assessment and Plan of Treatment: You have a past medical history of end stage renal disease on dialysis every Tuesday and Saturday. During your hospital course your electrolytes remained within normal limits and you did not require emergent dialysis. You did have one inpatient session of dialysis as per your regular schedule. You should continue to follow with your kidney doctor and resume your regular hemodialysis schedule as an outpatient.    Diagnosis: Incidental lung nodule, > 3mm and < 8mm  Assessment and Plan of Treatment: When you presented to the hospital you had CT imaging of your chest to evaluate for pneumonia. In addition, the imaging showed some incidental findings including multiple bilateral pulmonary nodules measuring up to 7 mm as well as multiple liver lesions. You have a history of rectal cancer and it is possible that these imaging findings represent spread of your underlying cancer. You should follow up with your primary care doctor and oncologist with regards to treatment for these findings.

## 2019-11-25 NOTE — PHYSICAL THERAPY INITIAL EVALUATION ADULT - IMPAIRMENTS FOUND, PT EVAL
arousal, attention, and cognition/gait, locomotion, and balance/cognitive impairment/muscle strength

## 2019-11-25 NOTE — PROGRESS NOTE ADULT - PROBLEM SELECTOR PLAN 3
- HD twice a week (Tues, Sat)  - electrolytes wnl, no signs of volume overload or uremia  - Monitor chemistry including K, Mg and replete to normal  - Avoid nephrotoxic agents-NSAIDs, contrast, nephrotoxic antibiotics  - Nephrology following, no emergent need for HD today  - Dialysis access via groin catheter, no signs of being infected - HD twice a week (Tues, Sat)  - Cr uptrending 3.45>>4.92  - electrolytes wnl, no signs of volume overload or uremia  - Schedule for inpatient HD tomorrow 11/26   - Monitor chemistry including K, Mg and replete to normal  - Avoid nephrotoxic agents-NSAIDs, contrast, nephrotoxic antibiotics  - Nephrology following, no emergent need for HD today  - Dialysis access via groin catheter, no signs of being infected

## 2019-11-25 NOTE — DISCHARGE NOTE PROVIDER - NSDCMRMEDTOKEN_GEN_ALL_CORE_FT
Depakote:   midodrine:   ZyPREXA 5 mg oral tablet: 1 tab(s) orally once a day Depakote 500 mg oral delayed release tablet: 1 tab(s) orally 2 times a day  midodrine 10 mg oral tablet: 1 tab(s) orally 3 times a day  ZyPREXA 10 mg oral tablet: 1 tab(s) orally once a day divalproex sodium 500 mg oral delayed release tablet: 1 tab(s) orally 2 times a day  levoFLOXacin 250 mg oral tablet: 1 tab(s) orally every other day   midodrine 10 mg oral tablet: 1 tab(s) orally every 8 hours  OLANZapine 10 mg oral tablet, disintegratin tab(s) orally once a day

## 2019-11-25 NOTE — PHYSICAL THERAPY INITIAL EVALUATION ADULT - GAIT DEVIATIONS NOTED, PT EVAL
decreased weight-shifting ability/decreased velocity of limb motion/increased time in double stance/decreased swing-to-stance ratio/decreased step length/decreased stride length/decreased david

## 2019-11-25 NOTE — PHYSICAL THERAPY INITIAL EVALUATION ADULT - GENERAL OBSERVATIONS, REHAB EVAL
Pt tolerated 45min PT initial evaluation well. Pt rec'd in bed in NAD. Pt with hx of Bi-polar, speech occasional not related to task, however follows commands 100-75% of the time.

## 2019-11-25 NOTE — PROGRESS NOTE ADULT - PROBLEM SELECTOR PLAN 2
- Sepsis resolved, afebrile, BP improved   - S/p Vancomycin and Zosyn, 1.5L IVF in the ED.   - Lactate 2.6 -> 1.6, RVP negative  - Continue Midodrine 10 TID  - Follow up blood cultures, urine cultures.

## 2019-11-25 NOTE — DISCHARGE NOTE PROVIDER - CARE PROVIDER_API CALL
Santos Mckeon)  Internal Medicine  [address], [address]  [place] [address]  Phone: [phone]  Fax: [phone]  Scheduled Appointment: 12/05/2019 10:00 AM

## 2019-11-25 NOTE — PROGRESS NOTE ADULT - PROBLEM SELECTOR PLAN 7
- Patient not sure of all her meds. Called sister to get her meds, however she said she will bring a copy of her med list in the morning. Please follow up and update medrec - Meds list confirmed with patient's sister

## 2019-11-25 NOTE — PROGRESS NOTE ADULT - PROBLEM SELECTOR PLAN 9
- CT chest shows new pulmonary nodules and liver lesions, likely due to metastatic disease from rectal cancer.  - Patient and family was informed regarding new imaging findings

## 2019-11-25 NOTE — PHYSICAL THERAPY INITIAL EVALUATION ADULT - ADDITIONAL COMMENTS
Pt lives at [facility]. Pt amb independently with RW. Pt lives at [facility]. Pt amb independently with RW at [facility], use w/c for long distances.

## 2019-11-25 NOTE — PROGRESS NOTE ADULT - SUBJECTIVE AND OBJECTIVE BOX
No pain, no shortness of breath. She was upset about her meal.     Review of systems: All 10 points ROS was obtained except as above.     diVALproex  milliGRAM(s) Oral two times a day  heparin  Injectable 5000 Unit(s) SubCutaneous every 8 hours  midodrine. 10 milliGRAM(s) Oral every 8 hours  OLANZapine Disintegrating Tablet 10 milliGRAM(s) Oral daily  piperacillin/tazobactam IVPB.. 3.375 Gram(s) IV Intermittent every 12 hours      VITAL:  T(C): , Max: 36.9 (19 @ 05:45)  T(F): , Max: 98.5 (19 @ 05:45)  HR: 81 (19 @ 13:01)  BP: 116/78 (19 @ 13:01)  BP(mean): --  RR: 18 (19 @ 13:00)  SpO2: 92% (19 @ 13:01)  Wt(kg): --    19 @ 07:01  -  19 @ 07:00  --------------------------------------------------------  IN: 720 mL / OUT: 1 mL / NET: 719 mL    19 @ 07:01  -  19 @ 18:54  --------------------------------------------------------  IN: 1000 mL / OUT: 0 mL / NET: 1000 mL        PHYSICAL EXAM:  General: NAD; Alert  HEENT:  NCAT; PERRLA  Neck: No JVD; supple  Respiratory: CTA-b/l  Cardiac: RRR s1s2  Gastrointestinal: BS+, soft, NT/ND  Urologic: No bear  Extremities: No peripheral edema  Access: Right femoral tunnel catheter.     LABS:                          9.3    7.31  )-----------( 154      ( 2019 09:23 )             32.0     Na(142)/K(4.3)/Cl(107)/HCO3(24)/BUN(32)/Cr(4.92)Glu(76)/Ca(8.7)/Mg(2.8)/PO4(4.9)     @ 06:22  Na(141)/K(4.0)/Cl(102)/HCO3(26)/BUN(21)/Cr(3.45)Glu(88)/Ca(8.8)/Mg(2.5)/PO4(3.2)     @ 06:13  Na(136)/K(3.8)/Cl(94)/HCO3(27)/BUN(13)/Cr(2.39)Glu(88)/Ca(9.4)/Mg(--)/PO4(--)     @ 16:14    Urinalysis Basic - ( 2019 01:06 )    Color: Light Yellow / Appearance: Clear / S.007 / pH: x  Gluc: x / Ketone: Negative  / Bili: Negative / Urobili: Negative   Blood: x / Protein: 30 mg/dL / Nitrite: Negative   Leuk Esterase: Large / RBC: 5 /hpf / WBC 91 /HPF   Sq Epi: x / Non Sq Epi: 0 /hpf / Bacteria: Negative

## 2019-11-25 NOTE — PROGRESS NOTE ADULT - PROBLEM SELECTOR PLAN 8
- As per patient's sister, patient has hx of anal/rectal cancer with imaging 1 year ago showing no metastatic disease at the time. Current lung nodules and liver lesion seen on imaging may be due to underlying anal cancer.

## 2019-11-25 NOTE — PROGRESS NOTE ADULT - ASSESSMENT
ASSESSMENT/PLAN  Ms. Day is a 59 lady w/ bipolar disorder, acquired nephrogenic diabetes and ESRD on HD (Tue, Sat) secondary to lithium toxicity sent to the ED from dialysis center for hypotension and AMS during dialysis.     1. ESRD on hemodialysis. Next HD will be tomorrow Tuesday for 3 hours on Revaclear 300 and prime it 2x as she has dialysis reaction to other dialyzers. Goal will be one liter. Consent obtained.  2. Electrolytes are acceptable.  3. Euvolemic and at goal.   4. Secondary hyperparathyroidism. Put on low potassium and low phosphorus diet for now. Once start eating then add sevelamer but not for today.   5. Anemia of ESRD. Will give Aranesp of 40 mcg IV x one dose tomorrow.   6. Blood pressure well controlled.  7. Bipolar. Defer to primary.       Chela Zhang, DO  [employer]  [phone]

## 2019-11-25 NOTE — PROGRESS NOTE ADULT - SUBJECTIVE AND OBJECTIVE BOX
Chief Complaint: Patient is a 59y old Female who presents with a chief complaint of Altered Mentation (2019 02:43)    INTERVAL HPI/OVERNIGHT EVENTS:   Patient is seen at bedside in no acute distress. Patient reports that her cough is improving but she is still congested. Patient has been urinating without difficulty. Denies chest pain, shortness of breath, or fever.  Patient's sister, Ricky Renata Alston ([phone]), was present at bedside. Patient is scheduled for HD tomorrow . As per patient's sister, patient normally becomes hypotensive during HD sessions with SBP to the 90's.     REVIEW OF SYSTEMS:   CONSTITUTIONAL:  Denies fever, fatigue, weight loss.  Eyes:  Denies visual loss, blurred vision, double vision or scleral icterus.  Ears, Nose, Throat:  Denies hearing loss, sneezing, congestion, runny nose or sore throat.  SKIN:  Denies rash or itching.  CARDIOVASCULAR:  Denies chest pain, IVIS  RESPIRATORY:  Denies shortness of breath, Positive dry cough and congestion  GASTROINTESTINAL:  Denies anorexia, nausea, vomiting or diarrhea. No abdominal pain or blood.  GENITOURINARY:  Denies any hematuria, dysuria  NEUROLOGICAL:  Denies headache, dizziness, syncope, paralysis, ataxia, numbness or tingling in the extremities. No change in bowel or bladder control.  MUSCULOSKELETAL:  Denies muscle, back pain, joint pain or stiffness.  HEMATOLOGIC:  Denies anemia, bleeding or bruising.  LYMPHATICS:  Denies enlarged nodes.   PSYCHIATRIC:  Denies symptoms of depression or anxiety.  ENDOCRINOLOGIC:  Denies reports of sweating, cold or heat intolerance. No polyuria or polydipsia.      MEDICATIONS  (STANDING):  diVALproex  milliGRAM(s) Oral two times a day  heparin  Injectable 5000 Unit(s) SubCutaneous every 8 hours  midodrine. 10 milliGRAM(s) Oral every 8 hours  OLANZapine 5 milliGRAM(s) Oral daily  piperacillin/tazobactam IVPB.. 3.375 Gram(s) IV Intermittent every 12 hours    MEDICATIONS  (PRN):      Vital Signs Last 24 Hrs  T(C): 36.9 (2019 08:02), Max: 39 (2019 16:06)  T(F): 98.5 (2019 08:02), Max: 102.2 (2019 16:06)  HR: 76 (2019 08:02) (76 - 104)  BP: 107/69 (2019 08:02) (70/52 - 107/69)  BP(mean): --  RR: 18 (2019 08:02) (18 - 24)  SpO2: 98% (2019 08:02) (92% - 100%)  Supplemental O2: [ ] No, on Room Air [ ] Yes,     I&O's Detail    CAPILLARY BLOOD GLUCOSE      POCT Blood Glucose.: 103 mg/dL (2019 15:02)      PHYSICAL EXAM:    GENERAL: NAD  HEAD:  NC/AT  EYES: EOMI, white sclerae  ENMT: MMM, no oropharyngeal lesions or erythema appreciated, dentition well appearing  Neck: trachea midline, no appreciable masses  Pulm: normal work of breathing, congestion heard on upper lobes b/l  CV: S1&S2+, RRR, no murmurs, rubs or gallops  ABDOMEN: soft, nontender, nondistended  : no cva tenderness, no bear placed, right groin catheter in place no purulence or erythema noted  EXTREMITIES:  no appreciable edema in b/l LE, old AV fistula in left upper extremity   Neuro: AA&Ox3, no focal deficits  SKIN: warm and dry, no visible rash    LABS:                        10.7   12.02 )-----------( 170      ( 2019 16:14 )             35.5     11-24    141  |  102  |  21  ----------------------------<  88  4.0   |  26  |  3.45<H>    Ca    8.8      2019 06:13  Phos  3.2     11-24  Mg     2.5     11-24    TPro  6.8  /  Alb  2.9<L>  /  TBili  0.3  /  DBili  x   /  AST  16  /  ALT  5<L>  /  AlkPhos  123<H>  11-24    LIVER FUNCTIONS - ( 2019 06:13 )  Alb: 2.9 g/dL / Pro: 6.8 g/dL / ALK PHOS: 123 U/L / ALT: 5 U/L / AST: 16 U/L / GGT: x     / T. Bili 0.3 mg/dL / D. Bili x         PT/INR - ( 2019 16:14 )   PT: 12.5 sec;   INR: 1.09 ratio         PTT - ( 2019 16:14 )  PTT:44.7 sec  Urinalysis Basic - ( 2019 01:06 )    Color: Light Yellow / Appearance: Clear / S.007 / pH: x  Gluc: x / Ketone: Negative  / Bili: Negative / Urobili: Negative   Blood: x / Protein: 30 mg/dL / Nitrite: Negative   Leuk Esterase: Large / RBC: 5 /hpf / WBC 91 /HPF   Sq Epi: x / Non Sq Epi: 0 /hpf / Bacteria: Negative              Microbiology:    RADIOLOGY & ADDITIONAL TESTS:    EXAM:  CT CHEST                        PROCEDURE DATE:  2019    IMPRESSION:   Bilateral lower lobe airspace consolidations which may represent a   combination of atelectasis and/or pneumonia.    Multiple bilateral pulmonary nodules measuring up to 7 mm. In the absence   of known malignancy, short-term follow-up in 3-6 months may be obtained   to demonstrate stability.    Multiple indeterminate liver lesions. Further evaluation with MRI there   is recommended, if no contraindications exist.      EXAM:  XR CHEST AP OR PA 1V                        PROCEDURE DATE:  2019      IMPRESSION:  Bibasilar linear opacities more likely represents atelectasis than   infection.

## 2019-11-25 NOTE — DISCHARGE NOTE PROVIDER - HOSPITAL COURSE
59F w/ bipolar disorder, acquired nephrogenic diabetes and ESRD on HD (Tue, Sat) secondary to lithium toxicity sent to the ED from dialysis center for hypotension and AMS during dialysis. Admitted for severe sepsis likely due to aspiration PNA vs CAP c/b sepsis encephalopathy now resolved. In the ED patient was given IV fluids and broad spectrum antibiotics. CT Chest showed b/l lower lobe airspace consolidations likely PNA. Blood and urine cultures have been negative. Patient is being treated with Zosyn to cover causes for CAP or aspiration PNA. During the hospital course patient had one scheduled in-patient HD session and will continue current schedule as outpatient. 59F w/ bipolar disorder, acquired nephrogenic diabetes and ESRD on HD (Tue, Sat) secondary to lithium toxicity sent to the ED from dialysis center for hypotension and AMS during dialysis. Admitted for severe sepsis likely due to aspiration PNA vs CAP c/b sepsis encephalopathy now resolved. In the ED patient was given IV fluids and broad spectrum antibiotics. CT Chest showed b/l lower lobe airspace consolidations likely PNA. Blood and urine cultures have been negative. Patient was treated with Zosyn to cover causes for CAP or aspiration PNA. Patient is at risk for aspiration due to poor dentition and psychiatric history. During the hospital course patient had one scheduled in-patient HD session and will continue current schedule as outpatient. Antibiotics was transitioned to oral Levoquin and patient should continue taking the Levoquin for 3 more days for a total of 7 day course. Patient should follow up with primary care doctor to monitor for any signs of recurrent infection. This is a 59F w/ bipolar disorder, acquired nephrogenic diabetes and ESRD on HD (Tue, Sat) secondary to lithium toxicity sent to the ED from dialysis center for hypotension and AMS during dialysis. Admitted for severe sepsis likely due to aspiration PNA vs CAP c/b sepsis encephalopathy now resolved. In the ED patient was given IV fluids and broad spectrum antibiotics. CT Chest showed b/l lower lobe airspace consolidations likely PNA. Blood and urine cultures have been negative. Patient was treated with Zosyn to cover causes for CAP or aspiration PNA. Patient is at risk for aspiration due to poor dentition and psychiatric history. During the hospital course patient had one scheduled in-patient HD session and will continue current schedule as outpatient. Antibiotics was transitioned to oral Levoquin and patient should continue taking the Levoquin for 3 more days for a total of 7 day course. The patient and her sister- Dr Day were instrutecd for a follow up with her primary care doctor to monitor for any signs of recurrent infection. She remained hemodynamically stable  for discharge.

## 2019-11-25 NOTE — PROGRESS NOTE ADULT - PROBLEM SELECTOR PLAN 1
- Leukocytosis improving WBC 11.62 down from 12.02 yesterday, fever resolved, dry cough with bilateral lower lobe airspace consolidations seen on CT Chest. RVP negative  - S/p Vancomycin and Zosyn in the ED  - Follow up blood cultures  - Continue Zosyn 3.375 g q12 to cover CAP vs aspiration pneumonia  - Continue azithro 500mg q24 to cover atypical pneumonias,   - D/c vancomycin since groin catheter is not likely source of infection, no erythema or purulence around the catheter. - Leukocytosis and fever resolved, dry cough with bilateral lower lobe airspace consolidations seen on CT Chest. RVP negative  - S/p Vancomycin and Zosyn in the ED  - Blood, urine cultures NGTD  - Continue Zosyn 3.375 g q12 to cover CAP vs aspiration pneumonia  - D/c azithro since urine legionella was negative - Leukocytosis and fever resolved, dry cough with bilateral lower lobe airspace consolidations seen on CT Chest. RVP negative  - S/p Vancomycin and Zosyn in the ED  - Blood, urine cultures NGTD  - Continue Zosyn 3.375 g q12 (11/24 - ) to cover CAP vs aspiration pneumonia  - D/c azithro since urine legionella was negative

## 2019-11-25 NOTE — PROGRESS NOTE ADULT - PROBLEM SELECTOR PLAN 5
- Hgb 9.3, Likely due to hx of CKD, or vitamin deficiencies  - No overt sign of bleeding   - Continue to monitor Hb  - Need to determine baseline Hb  - Check B12, folate - Hgb stable 9.3 (baseline is around 10), Likely due to hx of CKD  - Vitamin B12, folate normal  - No overt sign of bleeding   - Continue to monitor Hb

## 2019-11-25 NOTE — PROGRESS NOTE ADULT - PROBLEM SELECTOR PLAN 10
Transitions of Care Status:  1.  Name of PCP: Santos Mckeon / Renata Day ([facility] & [facility] Physician)  2.  PCP Contacted on Admission: [ ] Y    [ ] N    3.  PCP contacted at Discharge: [ ] Y    [ ] N    [ ] N/A  4.  Post-Discharge Appointment Date and Location:  5.  Summary of Handoff given to PCP:

## 2019-11-26 ENCOUNTER — TRANSCRIPTION ENCOUNTER (OUTPATIENT)
Age: 59
End: 2019-11-26

## 2019-11-26 PROCEDURE — 99233 SBSQ HOSP IP/OBS HIGH 50: CPT

## 2019-11-26 RX ORDER — CHLORHEXIDINE GLUCONATE 213 G/1000ML
1 SOLUTION TOPICAL DAILY
Refills: 0 | Status: DISCONTINUED | OUTPATIENT
Start: 2019-11-26 | End: 2019-11-27

## 2019-11-26 RX ADMIN — DIVALPROEX SODIUM 500 MILLIGRAM(S): 500 TABLET, DELAYED RELEASE ORAL at 17:37

## 2019-11-26 RX ADMIN — HEPARIN SODIUM 5000 UNIT(S): 5000 INJECTION INTRAVENOUS; SUBCUTANEOUS at 06:39

## 2019-11-26 RX ADMIN — MIDODRINE HYDROCHLORIDE 10 MILLIGRAM(S): 2.5 TABLET ORAL at 21:26

## 2019-11-26 RX ADMIN — PIPERACILLIN AND TAZOBACTAM 25 GRAM(S): 4; .5 INJECTION, POWDER, LYOPHILIZED, FOR SOLUTION INTRAVENOUS at 17:37

## 2019-11-26 RX ADMIN — HEPARIN SODIUM 5000 UNIT(S): 5000 INJECTION INTRAVENOUS; SUBCUTANEOUS at 21:28

## 2019-11-26 RX ADMIN — HEPARIN SODIUM 5000 UNIT(S): 5000 INJECTION INTRAVENOUS; SUBCUTANEOUS at 13:52

## 2019-11-26 RX ADMIN — CHLORHEXIDINE GLUCONATE 1 APPLICATION(S): 213 SOLUTION TOPICAL at 14:52

## 2019-11-26 RX ADMIN — DIVALPROEX SODIUM 500 MILLIGRAM(S): 500 TABLET, DELAYED RELEASE ORAL at 06:39

## 2019-11-26 RX ADMIN — Medication 40 MICROGRAM(S): at 10:50

## 2019-11-26 RX ADMIN — PIPERACILLIN AND TAZOBACTAM 25 GRAM(S): 4; .5 INJECTION, POWDER, LYOPHILIZED, FOR SOLUTION INTRAVENOUS at 06:39

## 2019-11-26 RX ADMIN — OLANZAPINE 10 MILLIGRAM(S): 15 TABLET, FILM COATED ORAL at 13:52

## 2019-11-26 NOTE — PROGRESS NOTE ADULT - ATTENDING COMMENTS
Seen, examined the patient  Resting in bed, having lunch now, back from HD, no SOB, cough, chocking while swallow  - Reviewed labs, imaging    WBC 7.3, blood c/s neg, CT chest showed - B/L basal Pna, multiple lung nodules and liver lesions    spoke to patient and her sister- Dr Day, repeat CT chest in 3-6 months and outpatient Onc f/u requested  - c/w   - HD per renal  - d/c planning for tomorrow to NH,  is aware, Patient's sister is aware Seen, examined the patient  Resting in bed, having lunch now, back from HD, no SOB, cough, chocking while swallow  - Reviewed labs, imaging    WBC 7.3, blood c/s neg, CT chest showed - B/L basal Pna, multiple lung nodules and liver lesions    spoke to patient and her sister- Dr Day, repeat CT chest in 3-6 months and outpatient Onc f/u requested  - c/w IV zosyn, O2, bronchodilators  - HD per renal  - d/c planning for tomorrow to NH,  is aware, Patient's sister is aware

## 2019-11-26 NOTE — PROGRESS NOTE ADULT - SUBJECTIVE AND OBJECTIVE BOX
No pain, no shortness of breath. Seen on dialysis getting 1 liter of UF off. She has no complaints. HD nurse primed the dialyzer.     Review of systems: All 10 points ROS was obtained except as above.     diVALproex  milliGRAM(s) Oral two times a day  heparin  Injectable 5000 Unit(s) SubCutaneous every 8 hours  midodrine. 10 milliGRAM(s) Oral every 8 hours  OLANZapine Disintegrating Tablet 10 milliGRAM(s) Oral daily  piperacillin/tazobactam IVPB.. 3.375 Gram(s) IV Intermittent every 12 hours      VITAL:  T(C): , Max: 37 (11-26-19 @ 05:04)  T(F): , Max: 98.6 (11-26-19 @ 05:04)  HR: 76 (11-26-19 @ 09:52)  BP: 127/73 (11-26-19 @ 09:52)  BP(mean): --  RR: 18 (11-26-19 @ 09:52)  SpO2: 94% (11-26-19 @ 09:52)  Wt(kg): --    11-25-19 @ 07:01  -  11-26-19 @ 07:00  --------------------------------------------------------  IN: 1340 mL / OUT: 0 mL / NET: 1340 mL        PHYSICAL EXAM:  General: NAD; Alert  HEENT:  NCAT; PERRLA  Neck: No JVD; supple  Respiratory: CTA-b/l  Cardiac: RRR s1s2  Gastrointestinal: BS+, soft, NT/ND  Urologic: No bear  Extremities: No peripheral edema  Access: Right Femoral tunneled catheter in ue.     LABS:                          9.3    7.31  )-----------( 154      ( 25 Nov 2019 09:23 )             32.0     Na(142)/K(4.3)/Cl(107)/HCO3(24)/BUN(32)/Cr(4.92)Glu(76)/Ca(8.7)/Mg(2.8)/PO4(4.9)    11-25 @ 06:22  Na(141)/K(4.0)/Cl(102)/HCO3(26)/BUN(21)/Cr(3.45)Glu(88)/Ca(8.8)/Mg(2.5)/PO4(3.2)    11-24 @ 06:13  Na(136)/K(3.8)/Cl(94)/HCO3(27)/BUN(13)/Cr(2.39)Glu(88)/Ca(9.4)/Mg(--)/PO4(--)    11-23 @ 16:14    11-25    142  |  107  |  32<H>  ----------------------------<  76  4.3   |  24  |  4.92<H>    Ca    8.7      25 Nov 2019 06:22  Phos  4.9     11-25  Mg     2.8     11-25    TPro  6.6  /  Alb  2.8<L>  /  TBili  0.2  /  DBili  x   /  AST  14  /  ALT  6<L>  /  AlkPhos  117  11-25

## 2019-11-26 NOTE — PROGRESS NOTE ADULT - ASSESSMENT
ASSESSMENT/PLAN    Ms. Day is a 59 lady w/ bipolar disorder, acquired nephrogenic diabetes and ESRD on Hemodialysis (Tue, Sat) secondary to lithium toxicity sent to the ED from dialysis center for hypotension and AMS during dialysis.     1. ESRD on hemodialysis. Seen on dialysis for  3 hours on Revaclear 300 attempting 1 liter of UF off.   2. Electrolytes are acceptable.  3. Euvolemic and at goal.   4. Secondary hyperparathyroidism. Put on low potassium and low phosphorus diet for now.   5. Anemia of ESRD. GIve Aranesp of 40 mcg IV x one dose today.  6. Blood pressure well controlled. Was hypotensive on dialysis at one time but quickly recovered.   7. Bipolar. Defer to primary.     Spoke to the dialysis nurse.       Chela Zhang, DO  [facility]  [phone]

## 2019-11-26 NOTE — PROGRESS NOTE ADULT - PROBLEM SELECTOR PLAN 7
- Meds list confirmed with patient's sister Transitions of Care Status:  Name of PCP: Santos Mckeon / Renata Day ([facility] Physician)  Post-Discharge Appointment Date and Location: Dr. Santos Mckeon 12/5 10 AM [address] [place] [address]

## 2019-11-26 NOTE — PROGRESS NOTE ADULT - SUBJECTIVE AND OBJECTIVE BOX
Chief Complaint: Patient is a 59y old Female who presents with a chief complaint of Altered Mentation (2019 02:43)    INTERVAL HPI/OVERNIGHT EVENTS:   Patient is seen at bedside in no acute distress. Patient desatted to 80's off oxygen last night but asymptomatic and was put back on 2.5L O2. Patient reports that she has no current complaints and is having no respiratory distress. Patient has been urinating without difficulty. Denies chest pain, shortness of breath, or fever. Patient's sister,  Renata Restreposara ([phone]), was present at bedside. Patient is scheduled for HD this morning .     REVIEW OF SYSTEMS:   CONSTITUTIONAL:  Denies fever, fatigue, weight loss.  Eyes:  Denies visual loss, blurred vision, double vision or scleral icterus.  Ears, Nose, Throat:  Denies hearing loss, sneezing, congestion, runny nose or sore throat.  SKIN:  Denies rash or itching.  CARDIOVASCULAR:  Denies chest pain, IVIS  RESPIRATORY:  Denies shortness of breath, Positive dry cough and congestion  GASTROINTESTINAL:  Denies anorexia, nausea, vomiting or diarrhea. No abdominal pain or blood.  GENITOURINARY:  Denies any hematuria, dysuria  NEUROLOGICAL:  Denies headache, dizziness, syncope, paralysis, ataxia, numbness or tingling in the extremities. No change in bowel or bladder control.  MUSCULOSKELETAL:  Denies muscle, back pain, joint pain or stiffness.  HEMATOLOGIC:  Denies anemia, bleeding or bruising.  LYMPHATICS:  Denies enlarged nodes.   PSYCHIATRIC:  Denies symptoms of depression or anxiety.  ENDOCRINOLOGIC:  Denies reports of sweating, cold or heat intolerance. No polyuria or polydipsia.      MEDICATIONS  (STANDING):  diVALproex  milliGRAM(s) Oral two times a day  heparin  Injectable 5000 Unit(s) SubCutaneous every 8 hours  midodrine. 10 milliGRAM(s) Oral every 8 hours  OLANZapine 5 milliGRAM(s) Oral daily  piperacillin/tazobactam IVPB.. 3.375 Gram(s) IV Intermittent every 12 hours    MEDICATIONS  (PRN):      Vital Signs Last 24 Hrs  T(C): 36.9 (2019 08:02), Max: 39 (2019 16:06)  T(F): 98.5 (2019 08:02), Max: 102.2 (2019 16:06)  HR: 76 (2019 08:02) (76 - 104)  BP: 107/69 (2019 08:02) (70/52 - 107/69)  BP(mean): --  RR: 18 (2019 08:02) (18 - 24)  SpO2: 98% (2019 08:02) (92% - 100%)  Supplemental O2: [ ] No, on Room Air [ ] Yes,     I&O's Detail    CAPILLARY BLOOD GLUCOSE      POCT Blood Glucose.: 103 mg/dL (2019 15:02)      PHYSICAL EXAM:    GENERAL: NAD  HEAD:  NC/AT  EYES: EOMI, white sclerae  ENMT: MMM, no oropharyngeal lesions or erythema appreciated, dentition well appearing  Neck: trachea midline, no appreciable masses  Pulm: normal work of breathing, congestion heard on upper lobes b/l  CV: S1&S2+, RRR, no murmurs, rubs or gallops  ABDOMEN: soft, nontender, nondistended  : no cva tenderness, no bear placed, right groin catheter in place no purulence or erythema noted  EXTREMITIES:  no appreciable edema in b/l LE, old AV fistula in left upper extremity   Neuro: AA&Ox3, no focal deficits  SKIN: warm and dry, no visible rash    LABS:                        10.7   12.02 )-----------( 170      ( 2019 16:14 )             35.5     11-24    141  |  102  |  21  ----------------------------<  88  4.0   |  26  |  3.45<H>    Ca    8.8      2019 06:13  Phos  3.2     11-24  Mg     2.5     11-24    TPro  6.8  /  Alb  2.9<L>  /  TBili  0.3  /  DBili  x   /  AST  16  /  ALT  5<L>  /  AlkPhos  123<H>  11-24    LIVER FUNCTIONS - ( 2019 06:13 )  Alb: 2.9 g/dL / Pro: 6.8 g/dL / ALK PHOS: 123 U/L / ALT: 5 U/L / AST: 16 U/L / GGT: x     / T. Bili 0.3 mg/dL / D. Bili x         PT/INR - ( 2019 16:14 )   PT: 12.5 sec;   INR: 1.09 ratio         PTT - ( 2019 16:14 )  PTT:44.7 sec  Urinalysis Basic - ( 2019 01:06 )    Color: Light Yellow / Appearance: Clear / S.007 / pH: x  Gluc: x / Ketone: Negative  / Bili: Negative / Urobili: Negative   Blood: x / Protein: 30 mg/dL / Nitrite: Negative   Leuk Esterase: Large / RBC: 5 /hpf / WBC 91 /HPF   Sq Epi: x / Non Sq Epi: 0 /hpf / Bacteria: Negative              Microbiology:    RADIOLOGY & ADDITIONAL TESTS:    EXAM:  CT CHEST                        PROCEDURE DATE:  2019    IMPRESSION:   Bilateral lower lobe airspace consolidations which may represent a   combination of atelectasis and/or pneumonia.    Multiple bilateral pulmonary nodules measuring up to 7 mm. In the absence   of known malignancy, short-term follow-up in 3-6 months may be obtained   to demonstrate stability.    Multiple indeterminate liver lesions. Further evaluation with MRI there   is recommended, if no contraindications exist.      EXAM:  XR CHEST AP OR PA 1V                        PROCEDURE DATE:  2019      IMPRESSION:  Bibasilar linear opacities more likely represents atelectasis than   infection.

## 2019-11-26 NOTE — PROGRESS NOTE ADULT - PROBLEM SELECTOR PLAN 6
- Continue home Depakote, Zyprexa - CT chest shows new pulmonary nodules and liver lesions, likely due to metastatic disease from rectal cancer.  - Patient and family was informed regarding new imaging findings

## 2019-11-26 NOTE — PROGRESS NOTE ADULT - PROBLEM SELECTOR PLAN 5
- Hgb stable 9.3 (baseline is around 10), Likely due to hx of CKD  - Vitamin B12, folate normal  - No overt sign of bleeding   - Continue to monitor Hb - As per patient's sister, patient has hx of anal/rectal cancer with imaging 1 year ago showing no metastatic disease at the time. Current lung nodules and liver lesion seen on imaging may be due to underlying anal cancer.

## 2019-11-26 NOTE — PROGRESS NOTE ADULT - PROBLEM SELECTOR PLAN 3
- HD twice a week (Tues, Sat)  - Cr uptrending 3.45>>4.92  - Scheduled for in-patient HD this AM (11/26)  - electrolytes wnl, no signs of volume overload or uremia  - Schedule for inpatient HD tomorrow 11/26   - Monitor chemistry including K, Mg and replete to normal  - Avoid nephrotoxic agents-NSAIDs, contrast, nephrotoxic antibiotics  - Dialysis access via groin catheter, no signs of being infected - Hgb stable 9.3 (baseline is around 10), Likely due to hx of CKD  - Vitamin B12, folate normal  - No overt sign of bleeding   - Continue to monitor Hb

## 2019-11-26 NOTE — PROGRESS NOTE ADULT - ASSESSMENT
59F w/ bipolar disorder, acquired nephrogenic diabetes and ESRD on HD (Tue, Sat) secondary to lithium toxicity sent to the ED from dialysis center for hypotension and AMS during dialysis. Admitted for severe sepsis likely due to aspiration PNA vs CAP c/b sepsis encephalopathy, sepsis now has resolved. Scheduled for in-patient dialysis this AM, otherwise stable for discharge back to NH.

## 2019-11-26 NOTE — PROGRESS NOTE ADULT - PROBLEM SELECTOR PLAN 2
- Sepsis resolved, afebrile, BP improved   - S/p Vancomycin and Zosyn, 1.5L IVF in the ED.   - Lactate 2.6 -> 1.6, RVP negative  - Continue Midodrine 10 TID  - blood cultures, urine cultures NGTD. - HD twice a week (Tues, Sat)  - Cr uptrending 3.45>>4.92  - Scheduled for in-patient HD this AM (11/26)  - electrolytes wnl, no signs of volume overload or uremia  - Schedule for inpatient HD tomorrow 11/26   - Monitor chemistry including K, Mg and replete to normal  - Avoid nephrotoxic agents-NSAIDs, contrast, nephrotoxic antibiotics  - Dialysis access via groin catheter, no signs of being infected

## 2019-11-26 NOTE — PROGRESS NOTE ADULT - PROBLEM SELECTOR PLAN 1
- Leukocytosis and fever resolved, dry cough with bilateral lower lobe airspace consolidations seen on CT Chest. RVP negative  - S/p Vancomycin and Zosyn in the ED  - Blood, urine cultures NGTD  - Continue Zosyn 3.375 g q12 (11/24 - ) to cover CAP vs aspiration pneumonia  - D/c azithro since urine legionella was negative  - Transition to PO antibiotics - Leukocytosis and fever resolved, dry cough with bilateral lower lobe airspace consolidations seen on CT Chest. RVP negative  - Sepsis resolved, afebrile, BP improved   - S/p Vancomycin and Zosyn in the ED  - Blood, urine cultures NGTD  - Continue Zosyn 3.375 g q12 (11/24 - ) to cover CAP vs aspiration pneumonia  - D/c azithro since urine legionella was negative  - Transition to PO antibiotics

## 2019-11-26 NOTE — DISCHARGE NOTE NURSING/CASE MANAGEMENT/SOCIAL WORK - PATIENT PORTAL LINK FT
You can access the FollowMyHealth Patient Portal offered by [facility] by registering at the following website: [email or web address]. By joining [facility]’s FollowMyHealth portal, you will also be able to view your health information using other applications (apps) compatible with our system.

## 2019-11-26 NOTE — PROGRESS NOTE ADULT - PROBLEM SELECTOR PLAN 10
Transitions of Care Status:  1.  Name of PCP: Santos Mckeon / Renata Day ([facility] & [facility] Physician)  2.  PCP Contacted on Admission: [ ] Y    [ ] N    3.  PCP contacted at Discharge: [ ] Y    [ ] N    [ ] N/A  4.  Post-Discharge Appointment Date and Location:  5.  Summary of Handoff given to PCP:

## 2019-11-27 VITALS
HEART RATE: 71 BPM | TEMPERATURE: 99 F | OXYGEN SATURATION: 93 % | RESPIRATION RATE: 18 BRPM | SYSTOLIC BLOOD PRESSURE: 109 MMHG | DIASTOLIC BLOOD PRESSURE: 67 MMHG

## 2019-11-27 LAB
HBA1C BLD-MCNC: 4.5 % — SIGNIFICANT CHANGE UP (ref 4–5.6)
HCV AB S/CO SERPL IA: 0.2 S/CO — SIGNIFICANT CHANGE UP (ref 0–0.99)
HCV AB SERPL-IMP: SIGNIFICANT CHANGE UP

## 2019-11-27 PROCEDURE — 84100 ASSAY OF PHOSPHORUS: CPT

## 2019-11-27 PROCEDURE — 81001 URINALYSIS AUTO W/SCOPE: CPT

## 2019-11-27 PROCEDURE — 87633 RESP VIRUS 12-25 TARGETS: CPT

## 2019-11-27 PROCEDURE — 99239 HOSP IP/OBS DSCHRG MGMT >30: CPT

## 2019-11-27 PROCEDURE — 82435 ASSAY OF BLOOD CHLORIDE: CPT

## 2019-11-27 PROCEDURE — 82607 VITAMIN B-12: CPT

## 2019-11-27 PROCEDURE — 85610 PROTHROMBIN TIME: CPT

## 2019-11-27 PROCEDURE — 70450 CT HEAD/BRAIN W/O DYE: CPT

## 2019-11-27 PROCEDURE — 82962 GLUCOSE BLOOD TEST: CPT

## 2019-11-27 PROCEDURE — 86803 HEPATITIS C AB TEST: CPT

## 2019-11-27 PROCEDURE — 83735 ASSAY OF MAGNESIUM: CPT

## 2019-11-27 PROCEDURE — 96375 TX/PRO/DX INJ NEW DRUG ADDON: CPT

## 2019-11-27 PROCEDURE — 82330 ASSAY OF CALCIUM: CPT

## 2019-11-27 PROCEDURE — 82746 ASSAY OF FOLIC ACID SERUM: CPT

## 2019-11-27 PROCEDURE — 82140 ASSAY OF AMMONIA: CPT

## 2019-11-27 PROCEDURE — 87798 DETECT AGENT NOS DNA AMP: CPT

## 2019-11-27 PROCEDURE — 96374 THER/PROPH/DIAG INJ IV PUSH: CPT

## 2019-11-27 PROCEDURE — 80164 ASSAY DIPROPYLACETIC ACD TOT: CPT

## 2019-11-27 PROCEDURE — 87449 NOS EACH ORGANISM AG IA: CPT

## 2019-11-27 PROCEDURE — 87086 URINE CULTURE/COLONY COUNT: CPT

## 2019-11-27 PROCEDURE — 99285 EMERGENCY DEPT VISIT HI MDM: CPT | Mod: 25

## 2019-11-27 PROCEDURE — 83036 HEMOGLOBIN GLYCOSYLATED A1C: CPT

## 2019-11-27 PROCEDURE — 71250 CT THORAX DX C-: CPT

## 2019-11-27 PROCEDURE — 87486 CHLMYD PNEUM DNA AMP PROBE: CPT

## 2019-11-27 PROCEDURE — 86706 HEP B SURFACE ANTIBODY: CPT

## 2019-11-27 PROCEDURE — 82803 BLOOD GASES ANY COMBINATION: CPT

## 2019-11-27 PROCEDURE — 97162 PT EVAL MOD COMPLEX 30 MIN: CPT

## 2019-11-27 PROCEDURE — 80202 ASSAY OF VANCOMYCIN: CPT

## 2019-11-27 PROCEDURE — 85027 COMPLETE CBC AUTOMATED: CPT

## 2019-11-27 PROCEDURE — 82947 ASSAY GLUCOSE BLOOD QUANT: CPT

## 2019-11-27 PROCEDURE — 83690 ASSAY OF LIPASE: CPT

## 2019-11-27 PROCEDURE — 87581 M.PNEUMON DNA AMP PROBE: CPT

## 2019-11-27 PROCEDURE — 84295 ASSAY OF SERUM SODIUM: CPT

## 2019-11-27 PROCEDURE — 83605 ASSAY OF LACTIC ACID: CPT

## 2019-11-27 PROCEDURE — 93005 ELECTROCARDIOGRAM TRACING: CPT

## 2019-11-27 PROCEDURE — 87340 HEPATITIS B SURFACE AG IA: CPT

## 2019-11-27 PROCEDURE — 85730 THROMBOPLASTIN TIME PARTIAL: CPT

## 2019-11-27 PROCEDURE — 85014 HEMATOCRIT: CPT

## 2019-11-27 PROCEDURE — 84132 ASSAY OF SERUM POTASSIUM: CPT

## 2019-11-27 PROCEDURE — 80053 COMPREHEN METABOLIC PANEL: CPT

## 2019-11-27 PROCEDURE — 71045 X-RAY EXAM CHEST 1 VIEW: CPT

## 2019-11-27 PROCEDURE — 99261: CPT

## 2019-11-27 PROCEDURE — 93308 TTE F-UP OR LMTD: CPT

## 2019-11-27 PROCEDURE — 87040 BLOOD CULTURE FOR BACTERIA: CPT

## 2019-11-27 RX ORDER — DIVALPROEX SODIUM 500 MG/1
1 TABLET, DELAYED RELEASE ORAL
Qty: 0 | Refills: 0 | DISCHARGE
Start: 2019-11-27

## 2019-11-27 RX ORDER — MIDODRINE HYDROCHLORIDE 2.5 MG/1
1 TABLET ORAL
Qty: 0 | Refills: 0 | DISCHARGE

## 2019-11-27 RX ORDER — MIDODRINE HYDROCHLORIDE 2.5 MG/1
0 TABLET ORAL
Qty: 0 | Refills: 0 | DISCHARGE

## 2019-11-27 RX ORDER — OLANZAPINE 15 MG/1
1 TABLET, FILM COATED ORAL
Qty: 0 | Refills: 0 | DISCHARGE
Start: 2019-11-27

## 2019-11-27 RX ORDER — OLANZAPINE 15 MG/1
1 TABLET, FILM COATED ORAL
Qty: 0 | Refills: 0 | DISCHARGE

## 2019-11-27 RX ORDER — MIDODRINE HYDROCHLORIDE 2.5 MG/1
1 TABLET ORAL
Qty: 0 | Refills: 0 | DISCHARGE
Start: 2019-11-27

## 2019-11-27 RX ORDER — MIDODRINE HYDROCHLORIDE 2.5 MG/1
1 TABLET ORAL
Qty: 42 | Refills: 0
Start: 2019-11-27 | End: 2019-12-10

## 2019-11-27 RX ORDER — DIVALPROEX SODIUM 500 MG/1
1 TABLET, DELAYED RELEASE ORAL
Qty: 0 | Refills: 0 | DISCHARGE

## 2019-11-27 RX ORDER — DIVALPROEX SODIUM 500 MG/1
0 TABLET, DELAYED RELEASE ORAL
Qty: 0 | Refills: 0 | DISCHARGE

## 2019-11-27 RX ADMIN — MIDODRINE HYDROCHLORIDE 10 MILLIGRAM(S): 2.5 TABLET ORAL at 12:44

## 2019-11-27 RX ADMIN — DIVALPROEX SODIUM 500 MILLIGRAM(S): 500 TABLET, DELAYED RELEASE ORAL at 05:46

## 2019-11-27 RX ADMIN — HEPARIN SODIUM 5000 UNIT(S): 5000 INJECTION INTRAVENOUS; SUBCUTANEOUS at 05:45

## 2019-11-27 RX ADMIN — PIPERACILLIN AND TAZOBACTAM 25 GRAM(S): 4; .5 INJECTION, POWDER, LYOPHILIZED, FOR SOLUTION INTRAVENOUS at 05:45

## 2019-11-27 RX ADMIN — MIDODRINE HYDROCHLORIDE 10 MILLIGRAM(S): 2.5 TABLET ORAL at 05:46

## 2019-11-27 RX ADMIN — OLANZAPINE 10 MILLIGRAM(S): 15 TABLET, FILM COATED ORAL at 12:44

## 2019-11-27 NOTE — PROGRESS NOTE ADULT - ASSESSMENT
59F w/ bipolar disorder, acquired nephrogenic diabetes and ESRD on HD (Tue, Sat) secondary to lithium toxicity sent to the ED from dialysis center for hypotension and AMS during dialysis. Admitted for severe sepsis likely due to aspiration PNA vs CAP c/b sepsis encephalopathy, sepsis now has resolved. Stable for discharge back to NH with supplemental O2 and PO Abx. Patient's sister made aware of discharge.

## 2019-11-27 NOTE — PROGRESS NOTE ADULT - REASON FOR ADMISSION
Altered Mentation

## 2019-11-27 NOTE — PROGRESS NOTE ADULT - SUBJECTIVE AND OBJECTIVE BOX
No new complaints. s/p dialysis yesterday with 1L removed. Patient is discharge back to nursing home later today. She is in good spirits    VITAL:  T(C): , Max: 37.6 (11-26-19 @ 21:04)  T(F): , Max: 99.7 (11-26-19 @ 21:04)  HR: 88 (11-27-19 @ 11:36)  BP: 83/56 (11-27-19 @ 11:36)  RR: 18 (11-27-19 @ 04:23)  SpO2: 97% (11-27-19 @ 04:23)      PHYSICAL EXAM:    Constitutional: NAD; Alert  HEENT:  NCAT; DMM  Neck: No JVD; supple  Respiratory: CTA-b/l  Cardiac: RRR s1s2  Gastrointestinal: BS+, soft, NT/ND  Urologic: No bear  Extremities: No peripheral edema  Back: No CVAT b/l  Access: Right Femoral tunneled catheter     LABS:    Na(142)/K(4.3)/Cl(107)/HCO3(24)/BUN(32)/Cr(4.92)Glu(76)/Ca(8.7)/Mg(2.8)/PO4(4.9)    11-25 @ 06:22    ASSESSMENT/PLAN    Ms. Day is a 59 lady w/ bipolar disorder, acquired nephrogenic diabetes and ESRD on Hemodialysis (Tue, Sat) secondary to lithium toxicity sent to the ED from dialysis center for hypotension and AMS during dialysis.     1. ESRD on hemodialysis. s/p HD yesterday with 1 L removed  2. Electrolytes are acceptable.  3. Euvolemic and at goal.   4. Secondary hyperparathyroidism. continue low potassium and low phosphorus diet for now.   5. Anemia of ESRD.   6. Patient b/p this am was low- on midodrine 10mg po Q8  7. Bipolar. Defer to primary.   8. no renal objection to discharge      Christine Grimes NP  [facility]  [phone] No new complaints. s/p dialysis yesterday with 1L removed. Patient is discharge back to nursing home later today. She is in good spirits    VITAL:  T(C): , Max: 37.6 (11-26-19 @ 21:04)  T(F): , Max: 99.7 (11-26-19 @ 21:04)  HR: 88 (11-27-19 @ 11:36)  BP: 83/56 (11-27-19 @ 11:36)  RR: 18 (11-27-19 @ 04:23)  SpO2: 97% (11-27-19 @ 04:23)      PHYSICAL EXAM:    Constitutional: NAD; Alert  HEENT:  NCAT; DMM  Neck: No JVD; supple  Respiratory: CTA-b/l  Cardiac: RRR s1s2  Gastrointestinal: BS+, soft, NT/ND  Urologic: No bear  Extremities: No peripheral edema  Back: No CVAT b/l  Access: Right Femoral tunneled catheter     LABS:    Na(142)/K(4.3)/Cl(107)/HCO3(24)/BUN(32)/Cr(4.92)Glu(76)/Ca(8.7)/Mg(2.8)/PO4(4.9)    11-25 @ 06:22    ASSESSMENT/PLAN    Ms. Day is a 59 lady w/ bipolar disorder, acquired nephrogenic diabetes and ESRD on Hemodialysis (Tue, Sat) secondary to lithium toxicity sent to the ED from dialysis center for hypotension and AMS during dialysis.     1. ESRD on hemodialysis. s/p HD yesterday with 1 L removed  2. Electrolytes are acceptable.  3. Euvolemic and at goal.   4. Secondary hyperparathyroidism. continue low potassium and low phosphorus diet for now.   5. Anemia of ESRD.   6. Patient b/p this am was low- on midodrine 10mg po Q8  7. Bipolar. Defer to primary.   8. no renal objection to discharge      Christine Grimes NP  [facility]  [phone]    RENAL ATTENDING NOTE  Patient seen and examined with NP. Agree with assessment and plan as above.    Landon King MD  [facility]  [phone]

## 2019-11-27 NOTE — PROGRESS NOTE ADULT - PROBLEM SELECTOR PLAN 1
- Leukocytosis and fever resolved, dry cough with bilateral lower lobe airspace consolidations seen on CT Chest. RVP negative  - Sepsis resolved, afebrile, BP improved   - Blood, urine cultures NGTD  - D/c Zosyn 3.375 g q12 (11/24 - )   - Transition to PO Levoquin for 3 more days, to complete 7 day course

## 2019-11-27 NOTE — PROGRESS NOTE ADULT - PROBLEM SELECTOR PLAN 7
Transitions of Care Status:  Name of PCP: Santos Mckeon / Renata Day (Sister & [facility] Physician)  Post-Discharge Appointment Date and Location: Dr. Santos Mckeon 12/5 10 AM [address] [place] [address]  Patient's sister made aware of discharge back to nursing home.

## 2019-11-27 NOTE — PROGRESS NOTE ADULT - ATTENDING COMMENTS
Seen, examined the patient  Resting in bed, no SOB, cough, or chocking while swallow, BP fluctuates and on Midodrine  - Reviewed labs, imaging    WBC wnl, blood c/s neg, CT chest showed - B/L basal Pna, multiple lung nodules and liver lesions    spoke to patient and her sister- Dr Day, repeat CT chest in 3-6 months and outpatient Onc f/u requested  - will switch from IV zosyn to Levaquin 250mg q 48hrs for 3 more doses, needs O2, bronchodilators  - HD per renal  - d/c today to NH, spoke to  and patient's sister- Dr Day  - d/c time 43 min.

## 2019-11-27 NOTE — PROGRESS NOTE ADULT - PROBLEM SELECTOR PLAN 2
- HD twice a week (Tues, Sat)  - Cr uptrending 3.45>>4.92  - S/p in-patient HD (11/26)  - electrolytes wnl, no signs of volume overload or uremia  - S/p scheduled inpatient HD 11/26   - Dialysis access via groin catheter, no signs of being infected

## 2019-11-27 NOTE — PROGRESS NOTE ADULT - SUBJECTIVE AND OBJECTIVE BOX
Chief Complaint: Patient is a 59y old Female who presents with a chief complaint of Altered Mentation (2019 02:43)    INTERVAL HPI/OVERNIGHT EVENTS:   Patient is seen at bedside in no acute distress. Patient still requiring 2.5 L O2 via NC. As per , nursing home where patient is from already has O2 capabilities. Stable for discharge back to nursing home. Denies chest pain, shortness of breath, or fever.     REVIEW OF SYSTEMS:   CONSTITUTIONAL:  Denies fever, fatigue, weight loss.  Eyes:  Denies visual loss, blurred vision, double vision or scleral icterus.  Ears, Nose, Throat:  Denies hearing loss, sneezing, congestion, runny nose or sore throat.  SKIN:  Denies rash or itching.  CARDIOVASCULAR:  Denies chest pain, IVIS  RESPIRATORY:  Denies shortness of breath, Positive dry cough and congestion  GASTROINTESTINAL:  Denies anorexia, nausea, vomiting or diarrhea. No abdominal pain or blood.  GENITOURINARY:  Denies any hematuria, dysuria  NEUROLOGICAL:  Denies headache, dizziness, syncope, paralysis, ataxia, numbness or tingling in the extremities. No change in bowel or bladder control.  MUSCULOSKELETAL:  Denies muscle, back pain, joint pain or stiffness.  HEMATOLOGIC:  Denies anemia, bleeding or bruising.  LYMPHATICS:  Denies enlarged nodes.   PSYCHIATRIC:  Denies symptoms of depression or anxiety.  ENDOCRINOLOGIC:  Denies reports of sweating, cold or heat intolerance. No polyuria or polydipsia.      MEDICATIONS  (STANDING):  diVALproex  milliGRAM(s) Oral two times a day  heparin  Injectable 5000 Unit(s) SubCutaneous every 8 hours  midodrine. 10 milliGRAM(s) Oral every 8 hours  OLANZapine 5 milliGRAM(s) Oral daily  piperacillin/tazobactam IVPB.. 3.375 Gram(s) IV Intermittent every 12 hours    MEDICATIONS  (PRN):      Vital Signs Last 24 Hrs  T(C): 36.9 (2019 08:02), Max: 39 (2019 16:06)  T(F): 98.5 (2019 08:02), Max: 102.2 (2019 16:06)  HR: 76 (2019 08:02) (76 - 104)  BP: 107/69 (2019 08:02) (70/52 - 107/69)  BP(mean): --  RR: 18 (2019 08:02) (18 - 24)  SpO2: 98% (2019 08:02) (92% - 100%)  Supplemental O2: [ ] No, on Room Air [ ] Yes,     I&O's Detail    CAPILLARY BLOOD GLUCOSE      POCT Blood Glucose.: 103 mg/dL (2019 15:02)      PHYSICAL EXAM:    GENERAL: NAD  HEAD:  NC/AT  EYES: EOMI, white sclerae  ENMT: MMM, no oropharyngeal lesions or erythema appreciated, dentition well appearing  Neck: trachea midline, no appreciable masses  Pulm: normal work of breathing, congestion heard on upper lobes b/l  CV: S1&S2+, RRR, no murmurs, rubs or gallops  ABDOMEN: soft, nontender, nondistended  : no cva tenderness, no bear placed, right groin catheter in place no purulence or erythema noted  EXTREMITIES:  no appreciable edema in b/l LE, old AV fistula in left upper extremity   Neuro: AA&Ox3, no focal deficits  SKIN: warm and dry, no visible rash    LABS:                        10.7   12.02 )-----------( 170      ( 2019 16:14 )             35.5     11-24    141  |  102  |  21  ----------------------------<  88  4.0   |  26  |  3.45<H>    Ca    8.8      2019 06:13  Phos  3.2     11-24  Mg     2.5     11-24    TPro  6.8  /  Alb  2.9<L>  /  TBili  0.3  /  DBili  x   /  AST  16  /  ALT  5<L>  /  AlkPhos  123<H>  11-24    LIVER FUNCTIONS - ( 2019 06:13 )  Alb: 2.9 g/dL / Pro: 6.8 g/dL / ALK PHOS: 123 U/L / ALT: 5 U/L / AST: 16 U/L / GGT: x     / T. Bili 0.3 mg/dL / D. Bili x         PT/INR - ( 2019 16:14 )   PT: 12.5 sec;   INR: 1.09 ratio         PTT - ( 2019 16:14 )  PTT:44.7 sec  Urinalysis Basic - ( 2019 01:06 )    Color: Light Yellow / Appearance: Clear / S.007 / pH: x  Gluc: x / Ketone: Negative  / Bili: Negative / Urobili: Negative   Blood: x / Protein: 30 mg/dL / Nitrite: Negative   Leuk Esterase: Large / RBC: 5 /hpf / WBC 91 /HPF   Sq Epi: x / Non Sq Epi: 0 /hpf / Bacteria: Negative              Microbiology:    RADIOLOGY & ADDITIONAL TESTS:    EXAM:  CT CHEST                        PROCEDURE DATE:  2019    IMPRESSION:   Bilateral lower lobe airspace consolidations which may represent a   combination of atelectasis and/or pneumonia.    Multiple bilateral pulmonary nodules measuring up to 7 mm. In the absence   of known malignancy, short-term follow-up in 3-6 months may be obtained   to demonstrate stability.    Multiple indeterminate liver lesions. Further evaluation with MRI there   is recommended, if no contraindications exist.      EXAM:  XR CHEST AP OR PA 1V                        PROCEDURE DATE:  2019      IMPRESSION:  Bibasilar linear opacities more likely represents atelectasis than   infection.

## 2019-11-27 NOTE — PROGRESS NOTE ADULT - PROBLEM SELECTOR PLAN 3
- Hgb stable 9.3 (baseline is around 10), Likely due to hx of CKD  - Vitamin B12, folate normal  - No overt sign of bleeding   - Continue to monitor Hb

## 2019-11-28 LAB
CULTURE RESULTS: SIGNIFICANT CHANGE UP
SPECIMEN SOURCE: SIGNIFICANT CHANGE UP

## 2019-11-29 LAB
CULTURE RESULTS: SIGNIFICANT CHANGE UP
SPECIMEN SOURCE: SIGNIFICANT CHANGE UP

## 2023-03-13 NOTE — ED ADULT NURSE NOTE - NS ED NURSE REPORT GIVEN DT
[FreeTextEntry1] : It was my impression that except for the asymmetry at 4k in the opposite ear, her exam was normal.  I reassured her that there was no  "fluid " in the middle ear.  The 4k hz drop in the opposite ear was likely old - but I want to repeat the audiogram in a year and would suggest further evaluation should that increase.\par She has a moderate rhinitis.  I suggested stopping the tid sudafed- and recommended fluticasone x 2 weeks.  She still has some imbalance and I suggested vestibular exercises.\par Otherwise, I reassured her.  I would like to see her back in a year, but in 2 weeks if the sx persist.\par \par  24-Nov-2019 08:15
